# Patient Record
Sex: FEMALE | Race: BLACK OR AFRICAN AMERICAN | Employment: FULL TIME | URBAN - METROPOLITAN AREA
[De-identification: names, ages, dates, MRNs, and addresses within clinical notes are randomized per-mention and may not be internally consistent; named-entity substitution may affect disease eponyms.]

---

## 2017-12-28 PROBLEM — L30.8 SPONGIOTIC DERMATITIS: Status: ACTIVE | Noted: 2017-12-28

## 2018-03-15 DIAGNOSIS — L50.9 URTICARIA: ICD-10-CM

## 2018-03-15 DIAGNOSIS — R60.9 SWELLING: ICD-10-CM

## 2018-03-15 DIAGNOSIS — R21 RASH AND NONSPECIFIC SKIN ERUPTION: ICD-10-CM

## 2018-03-15 RX ORDER — CETIRIZINE HYDROCHLORIDE 10 MG/1
10 TABLET ORAL DAILY
Qty: 90 TABLET | Refills: 1 | Status: SHIPPED | OUTPATIENT
Start: 2018-03-15 | End: 2019-05-30 | Stop reason: SDUPTHER

## 2018-03-15 RX ORDER — FAMOTIDINE 20 MG/1
20 TABLET, FILM COATED ORAL 2 TIMES DAILY
Qty: 180 TABLET | Refills: 1 | Status: SHIPPED | OUTPATIENT
Start: 2018-03-15 | End: 2018-04-26

## 2018-03-16 ENCOUNTER — TELEPHONE (OUTPATIENT)
Dept: FAMILY MEDICINE CLINIC | Age: 23
End: 2018-03-16

## 2018-03-16 DIAGNOSIS — R60.9 SWELLING: Primary | ICD-10-CM

## 2018-03-16 DIAGNOSIS — L50.9 URTICARIA: ICD-10-CM

## 2018-03-16 DIAGNOSIS — L30.8 SPONGIOTIC DERMATITIS: ICD-10-CM

## 2018-03-20 ENCOUNTER — TELEPHONE (OUTPATIENT)
Dept: FAMILY MEDICINE CLINIC | Age: 23
End: 2018-03-20

## 2018-03-20 DIAGNOSIS — R21 RASH AND NONSPECIFIC SKIN ERUPTION: ICD-10-CM

## 2018-03-20 DIAGNOSIS — L30.8 SPONGIOTIC DERMATITIS: Primary | ICD-10-CM

## 2018-04-26 ENCOUNTER — OFFICE VISIT (OUTPATIENT)
Dept: FAMILY MEDICINE CLINIC | Age: 23
End: 2018-04-26
Payer: COMMERCIAL

## 2018-04-26 VITALS
HEART RATE: 55 BPM | SYSTOLIC BLOOD PRESSURE: 118 MMHG | RESPIRATION RATE: 16 BRPM | WEIGHT: 154 LBS | OXYGEN SATURATION: 96 % | TEMPERATURE: 99 F | HEIGHT: 65 IN | DIASTOLIC BLOOD PRESSURE: 64 MMHG | BODY MASS INDEX: 25.66 KG/M2

## 2018-04-26 DIAGNOSIS — L30.9 DERMATITIS: Primary | ICD-10-CM

## 2018-04-26 DIAGNOSIS — Z30.42 ENCOUNTER FOR MANAGEMENT AND INJECTION OF DEPO-PROVERA: ICD-10-CM

## 2018-04-26 DIAGNOSIS — L98.3 WELLS' SYNDROME: ICD-10-CM

## 2018-04-26 LAB
CONTROL: NORMAL
PREGNANCY TEST URINE, POC: NORMAL

## 2018-04-26 PROCEDURE — G8427 DOCREV CUR MEDS BY ELIG CLIN: HCPCS | Performed by: FAMILY MEDICINE

## 2018-04-26 PROCEDURE — 96372 THER/PROPH/DIAG INJ SC/IM: CPT | Performed by: FAMILY MEDICINE

## 2018-04-26 PROCEDURE — 99213 OFFICE O/P EST LOW 20 MIN: CPT | Performed by: FAMILY MEDICINE

## 2018-04-26 PROCEDURE — 1036F TOBACCO NON-USER: CPT | Performed by: FAMILY MEDICINE

## 2018-04-26 PROCEDURE — 81025 URINE PREGNANCY TEST: CPT | Performed by: FAMILY MEDICINE

## 2018-04-26 PROCEDURE — G8419 CALC BMI OUT NRM PARAM NOF/U: HCPCS | Performed by: FAMILY MEDICINE

## 2018-04-26 RX ORDER — MEDROXYPROGESTERONE ACETATE 150 MG/ML
150 INJECTION, SUSPENSION INTRAMUSCULAR ONCE
Status: COMPLETED | OUTPATIENT
Start: 2018-04-26 | End: 2018-04-26

## 2018-04-26 RX ADMIN — MEDROXYPROGESTERONE ACETATE 150 MG: 150 INJECTION, SUSPENSION INTRAMUSCULAR at 15:28

## 2018-04-26 RX ADMIN — MEDROXYPROGESTERONE ACETATE 150 MG: 150 INJECTION, SUSPENSION INTRAMUSCULAR at 08:35

## 2018-04-26 ASSESSMENT — PATIENT HEALTH QUESTIONNAIRE - PHQ9
2. FEELING DOWN, DEPRESSED OR HOPELESS: 0
SUM OF ALL RESPONSES TO PHQ QUESTIONS 1-9: 0
1. LITTLE INTEREST OR PLEASURE IN DOING THINGS: 0
SUM OF ALL RESPONSES TO PHQ9 QUESTIONS 1 & 2: 0

## 2018-05-07 ASSESSMENT — ENCOUNTER SYMPTOMS
WHEEZING: 0
COUGH: 0

## 2018-07-26 ENCOUNTER — HOSPITAL ENCOUNTER (OUTPATIENT)
Age: 23
Discharge: HOME | End: 2018-07-28
Payer: COMMERCIAL

## 2018-07-26 ENCOUNTER — OFFICE VISIT (OUTPATIENT)
Dept: FAMILY MEDICINE CLINIC | Age: 23
End: 2018-07-26
Payer: COMMERCIAL

## 2018-07-26 VITALS
DIASTOLIC BLOOD PRESSURE: 68 MMHG | SYSTOLIC BLOOD PRESSURE: 110 MMHG | WEIGHT: 148 LBS | HEIGHT: 65 IN | OXYGEN SATURATION: 98 % | TEMPERATURE: 99.1 F | HEART RATE: 70 BPM | BODY MASS INDEX: 24.66 KG/M2

## 2018-07-26 DIAGNOSIS — Z87.42 HISTORY OF DYSMENORRHEA: ICD-10-CM

## 2018-07-26 DIAGNOSIS — R21 RASH AND NONSPECIFIC SKIN ERUPTION: ICD-10-CM

## 2018-07-26 DIAGNOSIS — Z12.4 ROUTINE PAPANICOLAOU SMEAR: Primary | ICD-10-CM

## 2018-07-26 DIAGNOSIS — Z30.09 FAMILY PLANNING: ICD-10-CM

## 2018-07-26 LAB
CONTROL: NORMAL
PREGNANCY TEST URINE, POC: NORMAL

## 2018-07-26 PROCEDURE — 87290 VARICELLA ZOSTER AG IF: CPT

## 2018-07-26 PROCEDURE — 87491 CHLMYD TRACH DNA AMP PROBE: CPT

## 2018-07-26 PROCEDURE — 1036F TOBACCO NON-USER: CPT | Performed by: FAMILY MEDICINE

## 2018-07-26 PROCEDURE — 87591 N.GONORRHOEAE DNA AMP PROB: CPT

## 2018-07-26 PROCEDURE — 99395 PREV VISIT EST AGE 18-39: CPT | Performed by: FAMILY MEDICINE

## 2018-07-26 PROCEDURE — G8419 CALC BMI OUT NRM PARAM NOF/U: HCPCS | Performed by: FAMILY MEDICINE

## 2018-07-26 PROCEDURE — 86787 VARICELLA-ZOSTER ANTIBODY: CPT

## 2018-07-26 PROCEDURE — G0123 SCREEN CERV/VAG THIN LAYER: HCPCS

## 2018-07-26 PROCEDURE — 36415 COLL VENOUS BLD VENIPUNCTURE: CPT | Performed by: FAMILY MEDICINE

## 2018-07-26 PROCEDURE — 99213 OFFICE O/P EST LOW 20 MIN: CPT | Performed by: FAMILY MEDICINE

## 2018-07-26 PROCEDURE — 96372 THER/PROPH/DIAG INJ SC/IM: CPT | Performed by: FAMILY MEDICINE

## 2018-07-26 PROCEDURE — 81025 URINE PREGNANCY TEST: CPT | Performed by: FAMILY MEDICINE

## 2018-07-26 PROCEDURE — G8427 DOCREV CUR MEDS BY ELIG CLIN: HCPCS | Performed by: FAMILY MEDICINE

## 2018-07-26 RX ORDER — METHYLPREDNISOLONE 4 MG/1
TABLET ORAL
Qty: 1 KIT | Refills: 0 | Status: SHIPPED | OUTPATIENT
Start: 2018-07-26 | End: 2018-08-01

## 2018-07-26 RX ORDER — TRIAMCINOLONE ACETONIDE 1 MG/G
CREAM TOPICAL
Qty: 1 TUBE | Refills: 1 | Status: SHIPPED | OUTPATIENT
Start: 2018-07-26 | End: 2019-05-30 | Stop reason: ALTCHOICE

## 2018-07-26 RX ORDER — MEDROXYPROGESTERONE ACETATE 150 MG/ML
150 INJECTION, SUSPENSION INTRAMUSCULAR ONCE
Status: COMPLETED | OUTPATIENT
Start: 2018-07-26 | End: 2018-07-26

## 2018-07-26 RX ORDER — VALACYCLOVIR HYDROCHLORIDE 1 G/1
1000 TABLET, FILM COATED ORAL 3 TIMES DAILY
Qty: 15 TABLET | Refills: 0 | Status: SHIPPED | OUTPATIENT
Start: 2018-07-26 | End: 2018-07-31

## 2018-07-26 RX ADMIN — MEDROXYPROGESTERONE ACETATE 150 MG: 150 INJECTION, SUSPENSION INTRAMUSCULAR at 08:31

## 2018-07-26 NOTE — LETTER
[facility] AND [facility]  [address] [address]  Phone: [phone]  Fax: [phone]    Argelia Mathis MD        July 26, 2018     Patient: Connie Glass   YOB: 1995   Date of Visit: 7/26/2018       To Whom it May Concern:    Wendy Smart was seen in my clinic on 7/26/2018. Please excuse from work on July 25, 2018 and July 26 2018. She may return to work on July 29, 2018 . If you have any questions or concerns, please don't hesitate to call.     Sincerely,         Argelia Mathis MD

## 2018-07-26 NOTE — PROGRESS NOTES
triamcinolone (KENALOG) 0.1 % cream; Apply topically 2 times daily. -     VARICELLA ZOSTER VIRUS CULTURE; Future  -     valACYclovir (VALTREX) 1 g tablet; Take 1 tablet by mouth 3 times daily for 5 days    History of dysmenorrhea  Urine hcg negative  Cont depo. -     medroxyPROGESTERone (DEPO-PROVERA) injection 150 mg; Inject 1 mL into the muscle once    Family planning  -     POCT urine pregnancy        F/u 3 months  Sooner if no improvement in rash    Advised patient to call with any new medication issues. All questions answered.   Call or go to ED immediately if symptoms worsen or persist.

## 2018-07-30 LAB
Lab: NORMAL
REPORT: NORMAL
THIS TEST SENT TO: NORMAL
VARICELLA ZOSTER AB IGM: 0.04 ISR

## 2018-07-31 LAB
CHLAMYDIA TRACHOMATIS AMPLIFIED DET: NORMAL
N GONORRHOEAE AMPLIFIED DET: NORMAL
VARICELLA-ZOSTER VIRUS AB, IGG: NORMAL

## 2019-05-28 ENCOUNTER — HOSPITAL ENCOUNTER (EMERGENCY)
Age: 24
Discharge: HOME | End: 2019-05-28
Payer: COMMERCIAL

## 2019-05-28 VITALS
TEMPERATURE: 99.1 F | OXYGEN SATURATION: 99 % | HEART RATE: 101 BPM | WEIGHT: 148 LBS | BODY MASS INDEX: 24.66 KG/M2 | HEIGHT: 65 IN | SYSTOLIC BLOOD PRESSURE: 117 MMHG | RESPIRATION RATE: 16 BRPM | DIASTOLIC BLOOD PRESSURE: 92 MMHG

## 2019-05-28 DIAGNOSIS — T30.0 BURN: Primary | ICD-10-CM

## 2019-05-28 PROCEDURE — 99282 EMERGENCY DEPT VISIT SF MDM: CPT

## 2019-05-28 PROCEDURE — 16020 DRESS/DEBRID P-THICK BURN S: CPT

## 2019-05-28 PROCEDURE — 6370000000 HC RX 637 (ALT 250 FOR IP): Performed by: NURSE PRACTITIONER

## 2019-05-28 RX ORDER — HYDROCODONE BITARTRATE AND ACETAMINOPHEN 5; 325 MG/1; MG/1
1 TABLET ORAL ONCE
Status: COMPLETED | OUTPATIENT
Start: 2019-05-28 | End: 2019-05-28

## 2019-05-28 RX ORDER — HYDROCODONE BITARTRATE AND ACETAMINOPHEN 5; 325 MG/1; MG/1
1 TABLET ORAL EVERY 6 HOURS PRN
Qty: 12 TABLET | Refills: 0 | Status: SHIPPED | OUTPATIENT
Start: 2019-05-28 | End: 2019-05-31

## 2019-05-28 RX ORDER — BACITRACIN, NEOMYCIN, POLYMYXIN B 400; 3.5; 5 [USP'U]/G; MG/G; [USP'U]/G
OINTMENT TOPICAL
Qty: 30 G | Refills: 0 | Status: SHIPPED | OUTPATIENT
Start: 2019-05-28 | End: 2019-06-07

## 2019-05-28 RX ORDER — IBUPROFEN 800 MG/1
800 TABLET ORAL EVERY 6 HOURS PRN
Qty: 21 TABLET | Refills: 0 | Status: SHIPPED | OUTPATIENT
Start: 2019-05-28 | End: 2020-09-01 | Stop reason: ALTCHOICE

## 2019-05-28 RX ORDER — DIAPER,BRIEF,INFANT-TODD,DISP
EACH MISCELLANEOUS
Status: DISCONTINUED
Start: 2019-05-28 | End: 2019-05-28 | Stop reason: HOSPADM

## 2019-05-28 RX ORDER — DIAPER,BRIEF,INFANT-TODD,DISP
EACH MISCELLANEOUS ONCE
Status: COMPLETED | OUTPATIENT
Start: 2019-05-28 | End: 2019-05-28

## 2019-05-28 RX ADMIN — HYDROCODONE BITARTRATE AND ACETAMINOPHEN 1 TABLET: 5; 325 TABLET ORAL at 19:42

## 2019-05-28 RX ADMIN — BACITRACIN ZINC 1 EACH: 500 OINTMENT TOPICAL at 19:42

## 2019-05-28 ASSESSMENT — PAIN DESCRIPTION - LOCATION: LOCATION: HAND

## 2019-05-28 ASSESSMENT — PAIN DESCRIPTION - FREQUENCY: FREQUENCY: CONTINUOUS

## 2019-05-28 ASSESSMENT — PAIN DESCRIPTION - PAIN TYPE: TYPE: ACUTE PAIN

## 2019-05-28 ASSESSMENT — PAIN SCALES - GENERAL: PAINLEVEL_OUTOF10: 10

## 2019-05-28 ASSESSMENT — PAIN DESCRIPTION - DESCRIPTORS: DESCRIPTORS: BURNING

## 2019-05-28 ASSESSMENT — PAIN DESCRIPTION - ORIENTATION: ORIENTATION: LEFT;RIGHT
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
## 2019-05-30 ENCOUNTER — OFFICE VISIT (OUTPATIENT)
Dept: FAMILY MEDICINE CLINIC | Age: 24
End: 2019-05-30
Payer: COMMERCIAL

## 2019-05-30 VITALS
WEIGHT: 165 LBS | OXYGEN SATURATION: 100 % | HEART RATE: 76 BPM | DIASTOLIC BLOOD PRESSURE: 74 MMHG | HEIGHT: 65 IN | BODY MASS INDEX: 27.49 KG/M2 | SYSTOLIC BLOOD PRESSURE: 122 MMHG | RESPIRATION RATE: 16 BRPM

## 2019-05-30 DIAGNOSIS — T23.169D: Primary | ICD-10-CM

## 2019-05-30 DIAGNOSIS — J30.2 SEASONAL ALLERGIES: ICD-10-CM

## 2019-05-30 PROCEDURE — 1036F TOBACCO NON-USER: CPT | Performed by: FAMILY MEDICINE

## 2019-05-30 PROCEDURE — G8427 DOCREV CUR MEDS BY ELIG CLIN: HCPCS | Performed by: FAMILY MEDICINE

## 2019-05-30 PROCEDURE — 99213 OFFICE O/P EST LOW 20 MIN: CPT | Performed by: FAMILY MEDICINE

## 2019-05-30 PROCEDURE — G8419 CALC BMI OUT NRM PARAM NOF/U: HCPCS | Performed by: FAMILY MEDICINE

## 2019-05-30 RX ORDER — CETIRIZINE HYDROCHLORIDE 10 MG/1
10 TABLET ORAL DAILY
Qty: 90 TABLET | Refills: 3 | Status: SHIPPED | OUTPATIENT
Start: 2019-05-30 | End: 2019-09-07 | Stop reason: SDUPTHER

## 2019-05-30 RX ORDER — FLUTICASONE PROPIONATE 50 MCG
2 SPRAY, SUSPENSION (ML) NASAL DAILY
Qty: 1 BOTTLE | Refills: 5 | Status: SHIPPED | OUTPATIENT
Start: 2019-05-30 | End: 2020-09-01 | Stop reason: SDUPTHER

## 2019-05-30 ASSESSMENT — PATIENT HEALTH QUESTIONNAIRE - PHQ9
SUM OF ALL RESPONSES TO PHQ QUESTIONS 1-9: 0
SUM OF ALL RESPONSES TO PHQ QUESTIONS 1-9: 0
2. FEELING DOWN, DEPRESSED OR HOPELESS: 0
1. LITTLE INTEREST OR PLEASURE IN DOING THINGS: 0
SUM OF ALL RESPONSES TO PHQ9 QUESTIONS 1 & 2: 0

## 2019-05-30 ASSESSMENT — ENCOUNTER SYMPTOMS
COUGH: 0
WHEEZING: 0
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
## 2019-05-30 NOTE — LETTER
[address] [place] [address]  Phone: [phone]  Fax: [phone]    Hilda Alvarez MD        May 30, 2019     Patient: Rey Tello   YOB: 1995   Date of Visit: 5/30/2019       To Whom It May Concern: It is my medical opinion that Nadia Gonzalez should remain out of work until Kiki 3, 2019. If you have any questions or concerns, please don't hesitate to call.     Sincerely,        Hilda Alvarez MD

## 2019-09-07 DIAGNOSIS — J30.2 SEASONAL ALLERGIES: ICD-10-CM

## 2019-09-10 RX ORDER — CETIRIZINE HYDROCHLORIDE 10 MG/1
10 TABLET ORAL DAILY
Qty: 90 TABLET | Refills: 3 | Status: SHIPPED | OUTPATIENT
Start: 2019-09-10 | End: 2020-09-01 | Stop reason: SDUPTHER

## 2020-09-01 ENCOUNTER — OFFICE VISIT (OUTPATIENT)
Dept: FAMILY MEDICINE CLINIC | Age: 25
End: 2020-09-01
Payer: COMMERCIAL

## 2020-09-01 ENCOUNTER — HOSPITAL ENCOUNTER (OUTPATIENT)
Age: 25
Discharge: HOME | End: 2020-09-03
Payer: COMMERCIAL

## 2020-09-01 VITALS
OXYGEN SATURATION: 98 % | SYSTOLIC BLOOD PRESSURE: 108 MMHG | BODY MASS INDEX: 27.66 KG/M2 | WEIGHT: 166 LBS | HEIGHT: 65 IN | TEMPERATURE: 96.7 F | HEART RATE: 70 BPM | DIASTOLIC BLOOD PRESSURE: 62 MMHG | RESPIRATION RATE: 18 BRPM

## 2020-09-01 PROCEDURE — 86787 VARICELLA-ZOSTER ANTIBODY: CPT

## 2020-09-01 PROCEDURE — 90471 IMMUNIZATION ADMIN: CPT | Performed by: FAMILY MEDICINE

## 2020-09-01 PROCEDURE — 90715 TDAP VACCINE 7 YRS/> IM: CPT | Performed by: FAMILY MEDICINE

## 2020-09-01 PROCEDURE — 99395 PREV VISIT EST AGE 18-39: CPT | Performed by: FAMILY MEDICINE

## 2020-09-01 PROCEDURE — 86580 TB INTRADERMAL TEST: CPT | Performed by: FAMILY MEDICINE

## 2020-09-01 PROCEDURE — 36415 COLL VENOUS BLD VENIPUNCTURE: CPT | Performed by: FAMILY MEDICINE

## 2020-09-01 PROCEDURE — 86765 RUBEOLA ANTIBODY: CPT

## 2020-09-01 PROCEDURE — 86735 MUMPS ANTIBODY: CPT

## 2020-09-01 PROCEDURE — 86762 RUBELLA ANTIBODY: CPT

## 2020-09-01 PROCEDURE — 86706 HEP B SURFACE ANTIBODY: CPT

## 2020-09-01 RX ORDER — CETIRIZINE HYDROCHLORIDE 10 MG/1
10 TABLET ORAL DAILY
Qty: 90 TABLET | Refills: 3 | Status: SHIPPED
Start: 2020-09-01 | End: 2021-05-15 | Stop reason: SDUPTHER

## 2020-09-01 RX ORDER — FLUTICASONE PROPIONATE 50 MCG
2 SPRAY, SUSPENSION (ML) NASAL DAILY
Qty: 1 BOTTLE | Refills: 5 | Status: SHIPPED
Start: 2020-09-01 | End: 2021-05-15 | Stop reason: SDUPTHER

## 2020-09-01 ASSESSMENT — ENCOUNTER SYMPTOMS
EYE DISCHARGE: 0
SHORTNESS OF BREATH: 0
SORE THROAT: 0
VOMITING: 0
BLOOD IN STOOL: 0
ABDOMINAL PAIN: 0
WHEEZING: 0
SINUS PRESSURE: 0
COUGH: 0

## 2020-09-01 ASSESSMENT — PATIENT HEALTH QUESTIONNAIRE - PHQ9
SUM OF ALL RESPONSES TO PHQ QUESTIONS 1-9: 0
SUM OF ALL RESPONSES TO PHQ9 QUESTIONS 1 & 2: 0
SUM OF ALL RESPONSES TO PHQ QUESTIONS 1-9: 0
1. LITTLE INTEREST OR PLEASURE IN DOING THINGS: 0
2. FEELING DOWN, DEPRESSED OR HOPELESS: 0

## 2020-09-01 NOTE — PROGRESS NOTES
S: 31 y.o. female with   Chief Complaint   Patient presents with    School/Camp Physical       Pt is here for nursing school PE. No complaints today. Does have allergies and needs refills. BP Readings from Last 3 Encounters:   09/01/20 108/62   05/30/19 122/74   05/28/19 (!) 117/92       O: VS:  height is 5' 5\" (1.651 m) and weight is 166 lb (75.3 kg). Her temperature is 96.7 °F (35.9 °C). Her blood pressure is 108/62 and her pulse is 70. Her respiration is 18 and oxygen saturation is 98%. As per resident note. Impression/Plan:   1) school PE - paperwork filled out. Titers drawn today        Health Maintenance Due   Topic Date Due    Varicella vaccine (1 of 2 - 2-dose childhood series) 03/03/1995    HPV vaccine (1 - 2-dose series) 03/03/2005    DTaP/Tdap/Td vaccine (1 - Tdap) 03/03/2013    A1C test (Diabetic or Prediabetic)  12/17/2015    Flu vaccine (1) 09/01/2020         Attending Physician Statement  I have discussed the case, including pertinent history and exam findings with the resident. I also have seen the patient and performed key portions of the examination. I agree with the documented assessment and plan.       Joan Vazquez MD

## 2020-09-01 NOTE — PROGRESS NOTES
9/1/2020    Stuart Limon is a 31 y.o. 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 Does not have any complaints or concerns. States she feels well. Does have seasonal allergies, well controlled on meds, and would like a refill. Patient denies any cough, fever or chills, nausea or vomiting, chest pain, sob, abdominal pain, urinary or bowel issues, palpitations, hx of syncope or any other concerns. BP Readings from Last 3 Encounters:   09/01/20 108/62   05/30/19 122/74   05/28/19 (!) 117/92     Current Outpatient Medications   Medication Sig Dispense Refill    cetirizine (ZYRTEC ALLERGY) 10 MG tablet Take 1 tablet by mouth daily 90 tablet 3    fluticasone (FLONASE) 50 MCG/ACT nasal spray 2 sprays by Each Nostril route daily 1 Bottle 5     Current Facility-Administered Medications   Medication Dose Route Frequency Provider Last Rate Last Dose    medroxyPROGESTERone (DEPO-PROVERA) injection 150 mg  150 mg Intramuscular Q3 Months Leonel Sarkar MD   150 mg at 04/26/18 0835      No Known Allergies    Past Medical & Surgical History:      Diagnosis Date    Abscess of bursa of wrist 12/28/2014     Past Surgical History:   Procedure Laterality Date    WRIST SURGERY         Family History:  No family history on file. Social History:  Social History     Tobacco Use    Smoking status: Never Smoker    Smokeless tobacco: Never Used   Substance Use Topics    Alcohol use: No       Immunization History   Administered Date(s) Administered    Influenza Virus Vaccine 04/01/2016    PPD Test 04/01/2016       Review of Systems   Constitutional: Negative for chills, fever and unexpected weight change. HENT: Negative for congestion, sinus pressure and sore throat. Eyes: Negative for discharge and visual disturbance. Respiratory: Negative for cough, shortness of breath and wheezing. Cardiovascular: Negative for chest pain and leg swelling. Gastrointestinal: Negative for abdominal pain, blood in stool and vomiting. Endocrine: Negative for cold intolerance, heat intolerance and polyuria. Genitourinary: Negative for dysuria and hematuria. Musculoskeletal: Negative for arthralgias, neck pain and neck stiffness. Skin: Negative for rash and wound. Allergic/Immunologic: Positive for environmental allergies. Neurological: Negative for weakness, numbness and headaches. Psychiatric/Behavioral: Negative for agitation and confusion. VS:  /62   Pulse 70   Temp 96.7 °F (35.9 °C)   Resp 18   Ht 5' 5\" (1.651 m)   Wt 166 lb (75.3 kg)   SpO2 98%   BMI 27.62 kg/m²     Physical Exam  Vitals signs reviewed. Constitutional:       General: She is not in acute distress. HENT:      Head: Normocephalic and atraumatic. Nose: No congestion. Mouth/Throat:      Pharynx: No oropharyngeal exudate or posterior oropharyngeal erythema. Eyes:      General: No scleral icterus. Conjunctiva/sclera: Conjunctivae normal.      Pupils: Pupils are equal, round, and reactive to light. Neck:      Musculoskeletal: Normal range of motion and neck supple. Cardiovascular:      Rate and Rhythm: Normal rate and regular rhythm. Heart sounds: Normal heart sounds. No murmur. Pulmonary:      Effort: Pulmonary effort is normal. No respiratory distress. Breath sounds: Normal breath sounds. No wheezing. Abdominal:      General: There is no distension. Palpations: Abdomen is soft. Tenderness: There is no abdominal tenderness. Musculoskeletal:      Right lower leg: No edema. Left lower leg: No edema. Lymphadenopathy:      Cervical: No cervical adenopathy. Skin:     General: Skin is warm. Findings: No rash. Neurological:      General: No focal deficit present. Mental Status: She is alert and oriented to person, place, and time.       Cranial Nerves:

## 2020-09-02 LAB
HBV SURFACE AB TITR SER: REACTIVE {TITER}
RUBELLA ANTIBODY IGG: NORMAL

## 2020-09-03 LAB
MEASLES IMMUNE (IGG): NORMAL
MUMPS AB IGG: NORMAL
VARICELLA-ZOSTER VIRUS AB, IGG: NORMAL

## 2020-09-16 ENCOUNTER — NURSE ONLY (OUTPATIENT)
Dept: FAMILY MEDICINE CLINIC | Age: 25
End: 2020-09-16
Payer: COMMERCIAL

## 2020-09-16 PROCEDURE — 86580 TB INTRADERMAL TEST: CPT | Performed by: FAMILY MEDICINE

## 2020-09-23 ENCOUNTER — NURSE ONLY (OUTPATIENT)
Dept: FAMILY MEDICINE CLINIC | Age: 25
End: 2020-09-23
Payer: COMMERCIAL

## 2020-09-23 PROCEDURE — 86580 TB INTRADERMAL TEST: CPT | Performed by: FAMILY MEDICINE

## 2020-09-23 PROCEDURE — 90471 IMMUNIZATION ADMIN: CPT | Performed by: FAMILY MEDICINE

## 2020-09-23 PROCEDURE — 90686 IIV4 VACC NO PRSV 0.5 ML IM: CPT | Performed by: FAMILY MEDICINE

## 2020-09-23 NOTE — PROGRESS NOTES
Vaccine Information Sheet, \"Influenza - Inactivated\"  given to Mary Alejandra, or parent/legal guardian of  Mary Alejandra and verbalized understanding. Patient responses:    Have you ever had a reaction to a flu vaccine? No  Do you have any current illness? No  Have you ever had Guillian [place] Syndrome? No  Do you have a serious allergy to any of the follow: Neomycin, Polymyxin, Thimerosal, eggs or egg products? No    Flu vaccine given per order. Please see immunization tab. Risks and benefits explained. Current VIS given.       Immunizations Administered     Name Date Dose Route    Influenza, Quadv, IM, PF (6 mo and older Fluzone, Flulaval, Fluarix, and 3 yrs and older Afluria) 9/23/2020 0.5 mL Intramuscular    Site: Deltoid- Left    Lot: [id number]    NDC: 37287-954-81    PPD Test 9/23/2020 0.1 mL Intradermal    Site: Right arm    Lot: [id number]    NDC: 68412-970-88

## 2020-11-11 ENCOUNTER — OFFICE VISIT (OUTPATIENT)
Dept: FAMILY MEDICINE CLINIC | Age: 25
End: 2020-11-11
Payer: COMMERCIAL

## 2020-11-11 VITALS
HEART RATE: 66 BPM | OXYGEN SATURATION: 98 % | SYSTOLIC BLOOD PRESSURE: 110 MMHG | BODY MASS INDEX: 25.76 KG/M2 | RESPIRATION RATE: 18 BRPM | WEIGHT: 154.6 LBS | DIASTOLIC BLOOD PRESSURE: 82 MMHG | TEMPERATURE: 96.8 F | HEIGHT: 65 IN

## 2020-11-11 PROCEDURE — 1036F TOBACCO NON-USER: CPT | Performed by: STUDENT IN AN ORGANIZED HEALTH CARE EDUCATION/TRAINING PROGRAM

## 2020-11-11 PROCEDURE — G8482 FLU IMMUNIZE ORDER/ADMIN: HCPCS | Performed by: STUDENT IN AN ORGANIZED HEALTH CARE EDUCATION/TRAINING PROGRAM

## 2020-11-11 PROCEDURE — 93000 ELECTROCARDIOGRAM COMPLETE: CPT | Performed by: STUDENT IN AN ORGANIZED HEALTH CARE EDUCATION/TRAINING PROGRAM

## 2020-11-11 PROCEDURE — G8427 DOCREV CUR MEDS BY ELIG CLIN: HCPCS | Performed by: STUDENT IN AN ORGANIZED HEALTH CARE EDUCATION/TRAINING PROGRAM

## 2020-11-11 PROCEDURE — G8419 CALC BMI OUT NRM PARAM NOF/U: HCPCS | Performed by: STUDENT IN AN ORGANIZED HEALTH CARE EDUCATION/TRAINING PROGRAM

## 2020-11-11 PROCEDURE — 99213 OFFICE O/P EST LOW 20 MIN: CPT | Performed by: STUDENT IN AN ORGANIZED HEALTH CARE EDUCATION/TRAINING PROGRAM

## 2020-11-11 ASSESSMENT — ENCOUNTER SYMPTOMS
BLOOD IN STOOL: 0
NAUSEA: 0
ABDOMINAL PAIN: 0
SHORTNESS OF BREATH: 0
WHEEZING: 0
SINUS PAIN: 0
BACK PAIN: 0
SORE THROAT: 0
DIARRHEA: 0
VOMITING: 0
CONSTIPATION: 0
COUGH: 0

## 2020-11-11 NOTE — PROGRESS NOTES
Chest pain  Episodic  Now constant for 2 days  No risk factors  Examination  Blood pressure 110/82, pulse 66, temperature 96.8 °F (36 °C), temperature source Temporal, resp. rate 18, height 5' 5\" (1.651 m), weight 154 lb 9.6 oz (70.1 kg), last menstrual period 11/09/2020, SpO2 98 %, not currently breastfeeding. pain reproduced by pressure  A/P  Costochondritis  Trial of NSAIDs  EKG normal  Attending Physician Statement  I have discussed the case, including pertinent history and exam findings with the resident. I agree with the documented assessment and plan.

## 2020-11-11 NOTE — PATIENT INSTRUCTIONS
Patient Education        Costochondritis: Care Instructions  Your Care Instructions  You have chest pain because the cartilage of your rib cage is inflamed. This problem is called costochondritis. This type of chest wall pain may last from days to weeks. It is not a heart problem. Sometimes costochondritis occurs with a cold or the flu, and other times the exact cause is not known. Follow-up care is a key part of your treatment and safety. Be sure to make and go to all appointments, and call your doctor if you are having problems. It's also a good idea to know your test results and keep a list of the medicines you take. How can you care for yourself at home? · Take medicines for pain and inflammation exactly as directed. ? If the doctor gave you a prescription medicine, take it as prescribed. ? If you are not taking a prescription pain medicine, ask your doctor if you can take an over-the-counter medicine. ? Do not take two or more pain medicines at the same time unless the doctor told you to. Many pain medicines have acetaminophen, which is Tylenol. Too much acetaminophen (Tylenol) can be harmful. · It may help to use a warm compress or heating pad (set on low) on your chest. You can also try alternating heat and ice. Put ice or a cold pack on the area for 10 to 20 minutes at a time. Put a thin cloth between the ice and your skin. · Avoid any activity that strains the chest area. As your pain gets better, you can slowly return to your normal activities. · Do not use tape, an elastic bandage, a \"rib belt,\" or anything else that restricts your chest wall motion. When should you call for help? Call 911 anytime you think you may need emergency care. For example, call if:    · You have new or different chest pain or pressure. This may occur with:  ? Sweating. ? Shortness of breath. ? Nausea or vomiting. ? Pain that spreads from the chest to the neck, jaw, or one or both shoulders or arms.   ? Dizziness or lightheadedness. ? A fast or uneven pulse. After calling 911, chew 1 adult-strength aspirin. Wait for an ambulance. Do not try to drive yourself.     · You have severe trouble breathing. Call your doctor now or seek immediate medical care if:    · You have a fever or cough.     · You have any trouble breathing.     · Your chest pain gets worse. Watch closely for changes in your health, and be sure to contact your doctor if:    · Your chest pain continues even though you are taking anti-inflammatory medicine.     · Your chest wall pain has not improved after 5 to 7 days. Where can you learn more? Go to https://[employer].Q.ME. org and sign in to your [employer] account. Enter Q557 in the [employer] box to learn more about \"Costochondritis: Care Instructions. \"     If you do not have an account, please click on the \"Sign Up Now\" link. Current as of: June 26, 2019               Content Version: 12.6  © 2255-9317 [employer]. Care instructions adapted under license by [facility] ([facility]). If you have questions about a medical condition or this instruction, always ask your healthcare professional. [place] [address] any warranty or liability for your use of this information.

## 2020-11-11 NOTE — PROGRESS NOTES
Patient:  Vitor Cruz 31 y.o. female     Date of Service: 11/11/20      Chiefcomplaint:   Chief Complaint   Patient presents with    Chest Pain     Intermittent for about 1 month, worse over the last week    Shortness of Breath     \"     History of Present Illness     Chest pain  First time about a month ago while in the car. Crushing pain in the center of her chest. This went away with rest. Also had a tingling sensation in her right arm. Now more constant over the past two days. Pressure is central and lateral on ribs b/l. No sob. No pleuritic pain. No hx heartburn  No change in activity  No change in weight  No swelling  Started school in september    No close fam hx asthma  Not diabetic  Dad with rheumatoid arthritis  Mom without significant medical problems  No personal hx diabetes  Grandparents on both sides are diabetic    Review of Systems:   Review of Systems   Constitutional: Negative for chills and fever. HENT: Negative for congestion, ear pain, sinus pain and sore throat. Respiratory: Negative for cough, shortness of breath and wheezing. Cardiovascular: Negative for chest pain, palpitations and leg swelling. Gastrointestinal: Negative for abdominal pain, blood in stool, constipation, diarrhea, nausea and vomiting. Endocrine: Negative for polydipsia. Genitourinary: Negative for dysuria, frequency and urgency. Musculoskeletal: Negative for back pain and myalgias. Skin: Negative for rash. Allergic/Immunologic: Negative for environmental allergies. Neurological: Negative for dizziness and headaches. Psychiatric/Behavioral: The patient is not nervous/anxious.         Pertinent Medical, Family, Surgical, Social History:  Past Medical History:   Diagnosis Date    Abscess of bursa of wrist 12/28/2014     Past Surgical History:   Procedure Laterality Date    WRIST SURGERY       Social History     Tobacco History     Smoking Status  Never Smoker    Smokeless Tobacco Use  Never Used          Alcohol History     Alcohol Use Status  No          Drug Use     Drug Use Status  No          Sexual Activity     Sexually Active  Yes Partners  Male              Physical Exam   Vitals: /82 (Site: Left Upper Arm, Position: Sitting, Cuff Size: Medium Adult)   Pulse 66   Temp 96.8 °F (36 °C) (Temporal)   Resp 18   Ht 5' 5\" (1.651 m)   Wt 154 lb 9.6 oz (70.1 kg)   LMP 11/09/2020   SpO2 98%   BMI 25.73 kg/m²   General Appearance: Alert, oriented, no acute distress  HEENT: No scleral icterus. No visible discharge from eyes  Neck: Not rigid. No visible masses  Chest wall/Lung: Clear to auscultation bilaterally,  respirations unlabored. No ronchi/wheezing/rales. Compressing the chest wall reproduces pain in the sternum. Heart[de-identified]  RRR, no murmur  Abdomen: Soft, nontender  Extremities:  No edema  Skin: No rashes. No jaundice  Neuro: Alert and oriented        Psych: Appropriate mood and appropriate affect    Assessment and Plan     1. Costochondral chest pain  Atypical chest pain reproducible with palpation. Recommend trial aleve for 2 weeks. Return in 2 weeks for recheck. If develops associated sob, sweating, worsening with activity go to ed for further eval.     2. Chest pain, unspecified type  ekg unremarkable. - EKG 12 Lead          Return to Office: Return in about 2 weeks (around 11/25/2020). Marely Will,        This document may have been prepared at least partially through the use of voice recognition software. Although effort is taken to assure the accuracy of this document, it is possible that grammatical, syntax,  or spelling errors may occur.

## 2021-01-11 ENCOUNTER — APPOINTMENT (OUTPATIENT)
Dept: CT IMAGING | Age: 26
End: 2021-01-11
Payer: COMMERCIAL

## 2021-01-11 ENCOUNTER — HOSPITAL ENCOUNTER (EMERGENCY)
Age: 26
Discharge: HOME | End: 2021-01-11
Payer: COMMERCIAL

## 2021-01-11 VITALS
OXYGEN SATURATION: 98 % | HEART RATE: 84 BPM | WEIGHT: 158 LBS | TEMPERATURE: 97.3 F | HEIGHT: 65 IN | BODY MASS INDEX: 26.33 KG/M2 | SYSTOLIC BLOOD PRESSURE: 131 MMHG | RESPIRATION RATE: 16 BRPM | DIASTOLIC BLOOD PRESSURE: 84 MMHG

## 2021-01-11 DIAGNOSIS — R51.9 ACUTE NONINTRACTABLE HEADACHE, UNSPECIFIED HEADACHE TYPE: Primary | ICD-10-CM

## 2021-01-11 DIAGNOSIS — J02.0 STREPTOCOCCAL SORE THROAT: ICD-10-CM

## 2021-01-11 LAB
HCG, URINE, POC: NEGATIVE
Lab: NORMAL
NEGATIVE QC PASS/FAIL: NORMAL
POSITIVE QC PASS/FAIL: NORMAL
STREP GRP A PCR: POSITIVE

## 2021-01-11 PROCEDURE — 96375 TX/PRO/DX INJ NEW DRUG ADDON: CPT

## 2021-01-11 PROCEDURE — 2580000003 HC RX 258: Performed by: NURSE PRACTITIONER

## 2021-01-11 PROCEDURE — 99283 EMERGENCY DEPT VISIT LOW MDM: CPT

## 2021-01-11 PROCEDURE — U0003 INFECTIOUS AGENT DETECTION BY NUCLEIC ACID (DNA OR RNA); SEVERE ACUTE RESPIRATORY SYNDROME CORONAVIRUS 2 (SARS-COV-2) (CORONAVIRUS DISEASE [COVID-19]), AMPLIFIED PROBE TECHNIQUE, MAKING USE OF HIGH THROUGHPUT TECHNOLOGIES AS DESCRIBED BY CMS-2020-01-R: HCPCS

## 2021-01-11 PROCEDURE — 87880 STREP A ASSAY W/OPTIC: CPT

## 2021-01-11 PROCEDURE — 6360000002 HC RX W HCPCS: Performed by: NURSE PRACTITIONER

## 2021-01-11 PROCEDURE — 96374 THER/PROPH/DIAG INJ IV PUSH: CPT

## 2021-01-11 PROCEDURE — 70450 CT HEAD/BRAIN W/O DYE: CPT

## 2021-01-11 RX ORDER — METOCLOPRAMIDE HYDROCHLORIDE 5 MG/ML
10 INJECTION INTRAMUSCULAR; INTRAVENOUS ONCE
Status: COMPLETED | OUTPATIENT
Start: 2021-01-11 | End: 2021-01-11

## 2021-01-11 RX ORDER — DIPHENHYDRAMINE HYDROCHLORIDE 50 MG/ML
12.5 INJECTION INTRAMUSCULAR; INTRAVENOUS ONCE
Status: COMPLETED | OUTPATIENT
Start: 2021-01-11 | End: 2021-01-11

## 2021-01-11 RX ORDER — 0.9 % SODIUM CHLORIDE 0.9 %
1000 INTRAVENOUS SOLUTION INTRAVENOUS ONCE
Status: COMPLETED | OUTPATIENT
Start: 2021-01-11 | End: 2021-01-11

## 2021-01-11 RX ORDER — KETOROLAC TROMETHAMINE 30 MG/ML
30 INJECTION, SOLUTION INTRAMUSCULAR; INTRAVENOUS ONCE
Status: COMPLETED | OUTPATIENT
Start: 2021-01-11 | End: 2021-01-11

## 2021-01-11 RX ORDER — IBUPROFEN 800 MG/1
800 TABLET ORAL EVERY 8 HOURS PRN
Qty: 30 TABLET | Refills: 0 | Status: SHIPPED | OUTPATIENT
Start: 2021-01-11 | End: 2021-04-16

## 2021-01-11 RX ORDER — AMOXICILLIN 500 MG/1
500 CAPSULE ORAL 3 TIMES DAILY
Qty: 30 CAPSULE | Refills: 0 | Status: SHIPPED | OUTPATIENT
Start: 2021-01-11 | End: 2021-01-21

## 2021-01-11 RX ADMIN — SODIUM CHLORIDE 1000 ML: 9 INJECTION, SOLUTION INTRAVENOUS at 12:21

## 2021-01-11 RX ADMIN — DIPHENHYDRAMINE HYDROCHLORIDE 12.5 MG: 50 INJECTION, SOLUTION INTRAMUSCULAR; INTRAVENOUS at 12:21

## 2021-01-11 RX ADMIN — METOCLOPRAMIDE HYDROCHLORIDE 10 MG: 5 INJECTION INTRAMUSCULAR; INTRAVENOUS at 12:20

## 2021-01-11 RX ADMIN — KETOROLAC TROMETHAMINE 30 MG: 30 INJECTION, SOLUTION INTRAMUSCULAR at 14:31

## 2021-01-11 ASSESSMENT — PAIN DESCRIPTION - FREQUENCY: FREQUENCY: CONTINUOUS

## 2021-01-11 ASSESSMENT — PAIN DESCRIPTION - LOCATION: LOCATION: HEAD

## 2021-01-11 NOTE — ED PROVIDER NOTES
[address]  Department of Emergency Medicine   ED  Encounter Note  Admit Date/RoomTime: 2021 11:45 AM  ED Room: [facility]    NAME: Sathya Hernadez  : 1995  MRN: [id number]     Chief Complaint:  Headache (started around 0130 this am. aaox4. no n/v noted. )    History of Present Illness      Sathya Hernadez is a 31 y.o. old female who presents to the emergency department by private vehicle, for complaint of gradual onset frontal aching pain which began 1 day(s) prior to arrival.  Since onset the symptoms have been stable and mild-moderate in severity. The patient has history of nothing pertinent. The pain is associated with spore throat and negative for fever, neck pain, numbness, weakness, speech or visual changes. The symptoms are aggravated by movement and relieved by nothing. There has been no history of recent trauma. Treatment prior to arrival consisted of: unable to obtain relief with OTC meds with minimal relief of symptoms. She takes no blood thinning agents. ROS   Pertinent positives and negatives are stated within HPI, all other systems reviewed and are negative. Past Medical History:  has a past medical history of Abscess of bursa of wrist.    Surgical History:  has a past surgical history that includes Wrist surgery. Social History:  reports that she has never smoked. She has never used smokeless tobacco. She reports that she does not drink alcohol or use drugs. Family History: family history is not on file. Allergies: Patient has no known allergies. Physical Exam   Oxygen Saturation Interpretation: Normal.        ED Triage Vitals [21 1007]   BP Temp Temp src Pulse Resp SpO2 Height Weight   127/81 97.3 °F (36.3 °C) -- 75 16 99 % 5' 5\" (1.651 m) 158 lb (71.7 kg)         Constitutional:  Alert, development consistent with age. HEENT:  NC/NT. PERRLA. Airway patent. Neck:  Normal ROM. Supple.   No meningeal signs Respiratory:  Clear to auscultation and breath sounds equal.  CV:  Regular rate and rhythm, normal heart sounds, without pathological murmurs, ectopy, gallops, or rubs. GI:  Abdomen Soft, nontender, good bowel sounds. No firm or pulsatile mass. Back:  No costovertebral tenderness. Extremities: No tenderness or edema noted. Integument:  Normal turgor. Warm, dry, without visible rash, unless noted elsewhere. Lymphatics: No lymphangitis or adenopathy noted. Neurological:  Oriented x 3, GCS 15. CNII-XII grossly intact. Motor functions intact. Lab / Imaging Results   (All laboratory and radiology results have been personally reviewed by myself)  Labs:  Results for orders placed or performed during the hospital encounter of 01/11/21   Strep Screen Group A Throat    Specimen: Throat   Result Value Ref Range    Strep Grp A PCR POSITIVE Negative   Covid-19 Ambulatory   Result Value Ref Range    Source NP swab    POC Pregnancy Urine Qual   Result Value Ref Range    HCG, Urine, POC Negative Negative    Lot Number [id number]     Positive QC Pass/Fail Pass     Negative QC Pass/Fail Pass      Imaging: All Radiology results interpreted by Radiologist unless otherwise noted. CT HEAD WO CONTRAST   Final Result   No evidence for acute intracranial findings.         ED Course / Medical Decision Making     Medications   0.9 % sodium chloride bolus (0 mLs Intravenous Stopped 1/11/21 1431)   metoclopramide (REGLAN) injection 10 mg (10 mg Intravenous Given 1/11/21 1220)   diphenhydrAMINE (BENADRYL) injection 12.5 mg (12.5 mg Intravenous Given 1/11/21 1221)   ketorolac (TORADOL) injection 30 mg (30 mg Intravenous Given 1/11/21 1431)            Consult(s):   None    Procedure(s):   none

## 2021-01-12 ENCOUNTER — CARE COORDINATION (OUTPATIENT)
Dept: CARE COORDINATION | Age: 26
End: 2021-01-12

## 2021-01-12 NOTE — CARE COORDINATION
Patient contacted regarding Sandra Garsia. Discussed COVID-19 related testing which was pending at this time. Test results were pending. Patient informed of results, if available? Results still pending at time of this call. Pt encouraged to monitor results from her active [employer] account. Care Transition Nurse/ Ambulatory Care Manager contacted the patient by telephone to perform post discharge assessment. Call within 2 business days of discharge: Yes. Verified name and  with patient as identifiers. Provided introduction to self, and explanation of the CTN/ACM role, and reason for call due to risk factors for infection and/or exposure to COVID-19. Symptoms reviewed with patient who verbalized the following symptoms: no new symptoms, no worsening symptoms and sore throat, HA. Due to no new or worsening symptoms encounter was not routed to provider for escalation. Discussed follow-up appointments. If no appointment was previously scheduled, appointment scheduling offered: Yes-Pt declined stating that she will schedule an appointment on her own if she feels that she is not improving. Pt states that she typically scheduled her appointments through [address] [facility] follow up appointment(s): No future appointments. Non-[facility] follow up appointment(s):     Non-face-to-face services provided:  Obtained and reviewed discharge summary and/or continuity of care documents     Advance Care Planning:   Does patient have an Advance Directive:  Health care decision maker information verified. Patient has following risk factors of: no known risk factors. CTN/ACM reviewed discharge instructions, medical action plan and red flags such as increased shortness of breath, increasing fever and signs of decompensation with patient who verbalized understanding. Discussed exposure protocols and quarantine with CDC Guidelines What to do if you are sick with coronavirus disease .  Patient was given an opportunity for questions and concerns. The patient agrees to contact the [facility] exposure line [phone], local health department [place] Department of Health: ([phone]) and PCP office for questions related to their healthcare. CTN/ACM provided contact information for future needs. Reviewed and educated patient on any new and changed medications related to discharge diagnosis     Patient/family/caregiver given information for [employer] Loop and agrees to enroll yes  Patient's preferred e-mail: prefers text message option   Patient's preferred phone number: [phone]  Based on Loop alert triggers, patient will be contacted by nurse care manager for worsening symptoms. Pt will be further monitored by COVID Loop Team based on severity of symptoms and risk factors. Called and spoke with pt regarding her ED visit on 1/11/21 for +strep throat. Pt states that she is feeling a little better today. Has mild sore throat and HA this morning. Denies any fevers, cough, or sob. Pt has pending COVID-19 test and is aware and is monitoring results through her [employer] account. Confirmed with pt that she picked up her new scripts and has started taking. Pt declined assistance with scheduling a f/u appt with her pcp, stating that she can make her own appt through [address] Pt agreeable to Loop enrollment. Pt also provided information on the [facility] for future reference or would prefer to f/u there.

## 2021-01-13 LAB
SARS-COV-2: NOT DETECTED
SOURCE: NORMAL

## 2021-04-15 PROBLEM — L30.8 SPONGIOTIC DERMATITIS: Status: RESOLVED | Noted: 2017-12-28 | Resolved: 2021-04-15

## 2021-04-15 NOTE — PROGRESS NOTES
Patient:  Jhon Alex 31 y.o. female     Date of Service: 11/11/20      Chiefcomplaint:   Chief Complaint   Patient presents with    Food Intolerance     Feeling full after small amount of food times 2 weeks    Heartburn     History of Present Illness     Early satiety and pressure in epigatric pain  Feeling some nausea with foods. 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     Past Surgical History:   Procedure Laterality Date    WRIST SURGERY       Social History     Tobacco History     Smoking Status  Never Smoker    Smokeless Tobacco Use  Never Used          Alcohol History     Alcohol Use Status  No          Drug Use     Drug Use Status  No          Sexual Activity     Sexually Active  Yes Partners  Male              Physical Exam   Vitals: /70 (Site: Left Upper Arm, Position: Sitting, Cuff Size: Medium Adult)   Pulse 58   Temp 97.3 °F (36.3 °C) (Temporal)   Resp 18   Ht 5' 5\" (1.651 m)   Wt 140 lb 9.6 oz (63.8 kg)   LMP 04/07/2021 (Exact Date)   SpO2 98%   BMI 23.40 kg/m²   General Appearance: Alert, oriented, no acute distress  HEENT: No scleral icterus. No visible discharge from eyes  Neck: Not rigid. No visible masses  Chest wall/Lung: Clear to auscultation bilaterally,  respirations unlabored. No ronchi/wheezing/rales. Heart[de-identified]  RRR, no murmur  Abdomen: Soft, tender epigastric  Extremities:  No edema  Skin: No rashes. No jaundice  Neuro: Alert and oriented        Psych: Appropriate mood and appropriate affect    Assessment and Plan     Aure was seen today for food intolerance and heartburn.     Diagnoses and all orders for this visit:    Dyspepsia  Recommend eval by gi

## 2021-04-16 ENCOUNTER — HOSPITAL ENCOUNTER (OUTPATIENT)
Age: 26
Discharge: HOME | End: 2021-04-16
Payer: COMMERCIAL

## 2021-04-16 ENCOUNTER — OFFICE VISIT (OUTPATIENT)
Dept: FAMILY MEDICINE CLINIC | Age: 26
End: 2021-04-16
Payer: COMMERCIAL

## 2021-04-16 VITALS
HEIGHT: 65 IN | OXYGEN SATURATION: 98 % | HEART RATE: 58 BPM | SYSTOLIC BLOOD PRESSURE: 112 MMHG | WEIGHT: 140.6 LBS | TEMPERATURE: 97.3 F | DIASTOLIC BLOOD PRESSURE: 70 MMHG | BODY MASS INDEX: 23.43 KG/M2 | RESPIRATION RATE: 18 BRPM

## 2021-04-16 DIAGNOSIS — R11.0 NAUSEA: ICD-10-CM

## 2021-04-16 DIAGNOSIS — R12 HEARTBURN: ICD-10-CM

## 2021-04-16 DIAGNOSIS — R10.13 DYSPEPSIA: Primary | ICD-10-CM

## 2021-04-16 DIAGNOSIS — R63.4 WEIGHT LOSS: ICD-10-CM

## 2021-04-16 LAB
ALBUMIN SERPL-MCNC: 4.5 G/DL (ref 3.5–5.2)
ALP BLD-CCNC: 49 U/L (ref 35–104)
ALT SERPL-CCNC: 8 U/L (ref 0–32)
ANION GAP SERPL CALCULATED.3IONS-SCNC: 5 MMOL/L (ref 7–16)
AST SERPL-CCNC: 8 U/L (ref 0–31)
BASOPHILS ABSOLUTE: 0.04 E9/L (ref 0–0.2)
BASOPHILS RELATIVE PERCENT: 0.5 % (ref 0–2)
BILIRUB SERPL-MCNC: 0.5 MG/DL (ref 0–1.2)
BUN BLDV-MCNC: 10 MG/DL (ref 6–20)
CALCIUM SERPL-MCNC: 9.7 MG/DL (ref 8.6–10.2)
CHLORIDE BLD-SCNC: 106 MMOL/L (ref 98–107)
CO2: 25 MMOL/L (ref 22–29)
CREAT SERPL-MCNC: 0.8 MG/DL (ref 0.5–1)
EOSINOPHILS ABSOLUTE: 0.05 E9/L (ref 0.05–0.5)
EOSINOPHILS RELATIVE PERCENT: 0.7 % (ref 0–6)
GFR AFRICAN AMERICAN: >60
GFR NON-AFRICAN AMERICAN: >60 ML/MIN/1.73
GLUCOSE BLD-MCNC: 95 MG/DL (ref 74–99)
HCT VFR BLD CALC: 35.3 % (ref 34–48)
HEMOGLOBIN: 11.3 G/DL (ref 11.5–15.5)
IMMATURE GRANULOCYTES #: 0.02 E9/L
IMMATURE GRANULOCYTES %: 0.3 % (ref 0–5)
LYMPHOCYTES ABSOLUTE: 2.38 E9/L (ref 1.5–4)
LYMPHOCYTES RELATIVE PERCENT: 31.2 % (ref 20–42)
MCH RBC QN AUTO: 29.6 PG (ref 26–35)
MCHC RBC AUTO-ENTMCNC: 32 % (ref 32–34.5)
MCV RBC AUTO: 92.4 FL (ref 80–99.9)
MONOCYTES ABSOLUTE: 0.5 E9/L (ref 0.1–0.95)
MONOCYTES RELATIVE PERCENT: 6.6 % (ref 2–12)
NEUTROPHILS ABSOLUTE: 4.63 E9/L (ref 1.8–7.3)
NEUTROPHILS RELATIVE PERCENT: 60.7 % (ref 43–80)
PDW BLD-RTO: 12.9 FL (ref 11.5–15)
PLATELET # BLD: 327 E9/L (ref 130–450)
PMV BLD AUTO: 9.9 FL (ref 7–12)
POTASSIUM SERPL-SCNC: 4.1 MMOL/L (ref 3.5–5)
RBC # BLD: 3.82 E12/L (ref 3.5–5.5)
SODIUM BLD-SCNC: 136 MMOL/L (ref 132–146)
TOTAL PROTEIN: 7.8 G/DL (ref 6.4–8.3)
WBC # BLD: 7.6 E9/L (ref 4.5–11.5)

## 2021-04-16 PROCEDURE — 85025 COMPLETE CBC W/AUTO DIFF WBC: CPT

## 2021-04-16 PROCEDURE — 80053 COMPREHEN METABOLIC PANEL: CPT

## 2021-04-16 PROCEDURE — 1036F TOBACCO NON-USER: CPT | Performed by: STUDENT IN AN ORGANIZED HEALTH CARE EDUCATION/TRAINING PROGRAM

## 2021-04-16 PROCEDURE — 36415 COLL VENOUS BLD VENIPUNCTURE: CPT

## 2021-04-16 PROCEDURE — G8427 DOCREV CUR MEDS BY ELIG CLIN: HCPCS | Performed by: STUDENT IN AN ORGANIZED HEALTH CARE EDUCATION/TRAINING PROGRAM

## 2021-04-16 PROCEDURE — G8420 CALC BMI NORM PARAMETERS: HCPCS | Performed by: STUDENT IN AN ORGANIZED HEALTH CARE EDUCATION/TRAINING PROGRAM

## 2021-04-16 PROCEDURE — 99213 OFFICE O/P EST LOW 20 MIN: CPT | Performed by: STUDENT IN AN ORGANIZED HEALTH CARE EDUCATION/TRAINING PROGRAM

## 2021-04-16 RX ORDER — PANTOPRAZOLE SODIUM 40 MG/1
40 TABLET, DELAYED RELEASE ORAL
Qty: 30 TABLET | Refills: 0 | Status: SHIPPED
Start: 2021-04-16 | End: 2021-07-11

## 2021-04-16 ASSESSMENT — PATIENT HEALTH QUESTIONNAIRE - PHQ9
SUM OF ALL RESPONSES TO PHQ QUESTIONS 1-9: 0
2. FEELING DOWN, DEPRESSED OR HOPELESS: 0
SUM OF ALL RESPONSES TO PHQ QUESTIONS 1-9: 0

## 2021-04-16 NOTE — PROGRESS NOTES
[facility]  Precepting Note    Subjective:  Nausea, early satiety  lost some weight  Has heart burn  Non smoker,drinker  Has PUD in family      ROS otherwise negative     Past medical, surgical, family and social history were reviewed, non-contributory, and unchanged unless otherwise stated. Objective:    /70 (Site: Left Upper Arm, Position: Sitting, Cuff Size: Medium Adult)   Pulse 58   Temp 97.3 °F (36.3 °C) (Temporal)   Resp 18   Ht 5' 5\" (1.651 m)   Wt 140 lb 9.6 oz (63.8 kg)   LMP 04/07/2021 (Exact Date)   SpO2 98%   BMI 23.40 kg/m²     Exam is as noted by resident     Assessment/Plan:  Nausea  Weight loss  Labs as ordered  Refer to GI     Attending Physician Statement  I have reviewed the chart, including any radiology or labs. I have discussed the case, including pertinent history and exam findings with the resident. I agree with the assessment, plan and orders as documented by the resident. Please refer to the resident note for additional information.       Electronically signed by Lisa Sandoval MD on 4/16/2021 at 12:03 PM

## 2021-05-15 DIAGNOSIS — J30.2 SEASONAL ALLERGIES: ICD-10-CM

## 2021-05-17 RX ORDER — FLUTICASONE PROPIONATE 50 MCG
2 SPRAY, SUSPENSION (ML) NASAL DAILY
Qty: 1 BOTTLE | Refills: 0 | Status: SHIPPED
Start: 2021-05-17 | End: 2022-05-17 | Stop reason: SDUPTHER

## 2021-05-17 RX ORDER — CETIRIZINE HYDROCHLORIDE 10 MG/1
10 TABLET ORAL DAILY
Qty: 90 TABLET | Refills: 0 | Status: SHIPPED
Start: 2021-05-17 | End: 2021-05-18 | Stop reason: SDUPTHER

## 2021-05-17 NOTE — TELEPHONE ENCOUNTER
Requested Prescriptions     Pending Prescriptions Disp Refills    fluticasone (FLONASE) 50 MCG/ACT nasal spray 1 Bottle 0     Si sprays by Each Nostril route daily    cetirizine (ZYRTEC ALLERGY) 10 MG tablet 90 tablet 0     Sig: Take 1 tablet by mouth daily

## 2021-05-18 RX ORDER — CETIRIZINE HYDROCHLORIDE 10 MG/1
10 TABLET ORAL DAILY
Qty: 90 TABLET | Refills: 0 | Status: SHIPPED
Start: 2021-05-18 | End: 2021-07-11

## 2021-05-21 ENCOUNTER — HOSPITAL ENCOUNTER (OUTPATIENT)
Dept: ULTRASOUND IMAGING | Age: 26
Discharge: HOME | End: 2021-05-23
Payer: COMMERCIAL

## 2021-05-21 DIAGNOSIS — R10.84 ABDOMINAL PAIN, GENERALIZED: ICD-10-CM

## 2021-05-21 DIAGNOSIS — R63.4 WEIGHT LOSS: ICD-10-CM

## 2021-05-21 PROCEDURE — 76705 ECHO EXAM OF ABDOMEN: CPT

## 2021-06-20 ENCOUNTER — HOSPITAL ENCOUNTER (OUTPATIENT)
Age: 26
Discharge: HOME | End: 2021-06-20
Payer: COMMERCIAL

## 2021-06-20 LAB — GONADOTROPIN, CHORIONIC (HCG) QUANT: 576.8 MIU/ML

## 2021-06-20 PROCEDURE — 84702 CHORIONIC GONADOTROPIN TEST: CPT

## 2021-06-20 PROCEDURE — 36415 COLL VENOUS BLD VENIPUNCTURE: CPT

## 2021-07-11 ENCOUNTER — HOSPITAL ENCOUNTER (EMERGENCY)
Age: 26
Discharge: ANOTHER HOSPITAL | End: 2021-07-12
Attending: EMERGENCY MEDICINE
Payer: COMMERCIAL

## 2021-07-11 DIAGNOSIS — O21.0 MILD HYPEREMESIS GRAVIDARUM, ANTEPARTUM: Primary | ICD-10-CM

## 2021-07-11 DIAGNOSIS — R10.31 ABDOMINAL PAIN, RIGHT LOWER QUADRANT: ICD-10-CM

## 2021-07-11 LAB
ALBUMIN SERPL-MCNC: 4.1 G/DL (ref 3.5–5.2)
ALP BLD-CCNC: 45 U/L (ref 35–104)
ALT SERPL-CCNC: 9 U/L (ref 0–32)
ANION GAP SERPL CALCULATED.3IONS-SCNC: 11 MMOL/L (ref 7–16)
AST SERPL-CCNC: 9 U/L (ref 0–31)
BASOPHILS ABSOLUTE: 0.03 E9/L (ref 0–0.2)
BASOPHILS RELATIVE PERCENT: 0.2 % (ref 0–2)
BILIRUB SERPL-MCNC: 0.4 MG/DL (ref 0–1.2)
BUN BLDV-MCNC: 12 MG/DL (ref 6–20)
CALCIUM SERPL-MCNC: 9.2 MG/DL (ref 8.6–10.2)
CHLORIDE BLD-SCNC: 101 MMOL/L (ref 98–107)
CO2: 23 MMOL/L (ref 22–29)
CREAT SERPL-MCNC: 0.7 MG/DL (ref 0.5–1)
EOSINOPHILS ABSOLUTE: 0.02 E9/L (ref 0.05–0.5)
EOSINOPHILS RELATIVE PERCENT: 0.2 % (ref 0–6)
GFR AFRICAN AMERICAN: >60
GFR NON-AFRICAN AMERICAN: >60 ML/MIN/1.73
GLUCOSE BLD-MCNC: 90 MG/DL (ref 74–99)
GONADOTROPIN, CHORIONIC (HCG) QUANT: ABNORMAL MIU/ML
HCT VFR BLD CALC: 30.2 % (ref 34–48)
HEMOGLOBIN: 10.1 G/DL (ref 11.5–15.5)
IMMATURE GRANULOCYTES #: 0.04 E9/L
IMMATURE GRANULOCYTES %: 0.3 % (ref 0–5)
LYMPHOCYTES ABSOLUTE: 1.89 E9/L (ref 1.5–4)
LYMPHOCYTES RELATIVE PERCENT: 15.6 % (ref 20–42)
MCH RBC QN AUTO: 29.4 PG (ref 26–35)
MCHC RBC AUTO-ENTMCNC: 33.4 % (ref 32–34.5)
MCV RBC AUTO: 88 FL (ref 80–99.9)
MONOCYTES ABSOLUTE: 0.74 E9/L (ref 0.1–0.95)
MONOCYTES RELATIVE PERCENT: 6.1 % (ref 2–12)
NEUTROPHILS ABSOLUTE: 9.36 E9/L (ref 1.8–7.3)
NEUTROPHILS RELATIVE PERCENT: 77.6 % (ref 43–80)
PDW BLD-RTO: 13.3 FL (ref 11.5–15)
PLATELET # BLD: 288 E9/L (ref 130–450)
PMV BLD AUTO: 10 FL (ref 7–12)
POTASSIUM SERPL-SCNC: 3.8 MMOL/L (ref 3.5–5)
RBC # BLD: 3.43 E12/L (ref 3.5–5.5)
SODIUM BLD-SCNC: 135 MMOL/L (ref 132–146)
TOTAL PROTEIN: 7.3 G/DL (ref 6.4–8.3)
WBC # BLD: 12.1 E9/L (ref 4.5–11.5)

## 2021-07-11 PROCEDURE — 99284 EMERGENCY DEPT VISIT MOD MDM: CPT

## 2021-07-11 PROCEDURE — 6360000002 HC RX W HCPCS: Performed by: EMERGENCY MEDICINE

## 2021-07-11 PROCEDURE — 85025 COMPLETE CBC W/AUTO DIFF WBC: CPT

## 2021-07-11 PROCEDURE — 96374 THER/PROPH/DIAG INJ IV PUSH: CPT

## 2021-07-11 PROCEDURE — 84702 CHORIONIC GONADOTROPIN TEST: CPT

## 2021-07-11 PROCEDURE — 2500000003 HC RX 250 WO HCPCS: Performed by: EMERGENCY MEDICINE

## 2021-07-11 PROCEDURE — 2580000003 HC RX 258: Performed by: EMERGENCY MEDICINE

## 2021-07-11 PROCEDURE — 36415 COLL VENOUS BLD VENIPUNCTURE: CPT

## 2021-07-11 PROCEDURE — 80053 COMPREHEN METABOLIC PANEL: CPT

## 2021-07-11 PROCEDURE — 96375 TX/PRO/DX INJ NEW DRUG ADDON: CPT

## 2021-07-11 RX ORDER — DIPHENHYDRAMINE HYDROCHLORIDE 50 MG/ML
50 INJECTION INTRAMUSCULAR; INTRAVENOUS ONCE
Status: COMPLETED | OUTPATIENT
Start: 2021-07-11 | End: 2021-07-11

## 2021-07-11 RX ORDER — METOCLOPRAMIDE HYDROCHLORIDE 5 MG/ML
10 INJECTION INTRAMUSCULAR; INTRAVENOUS ONCE
Status: COMPLETED | OUTPATIENT
Start: 2021-07-11 | End: 2021-07-11

## 2021-07-11 RX ORDER — 0.9 % SODIUM CHLORIDE 0.9 %
1500 INTRAVENOUS SOLUTION INTRAVENOUS ONCE
Status: COMPLETED | OUTPATIENT
Start: 2021-07-11 | End: 2021-07-11

## 2021-07-11 RX ADMIN — SODIUM CHLORIDE 1500 ML: 9 INJECTION, SOLUTION INTRAVENOUS at 22:42

## 2021-07-11 RX ADMIN — DIPHENHYDRAMINE HYDROCHLORIDE 50 MG: 50 INJECTION INTRAMUSCULAR; INTRAVENOUS at 22:41

## 2021-07-11 RX ADMIN — METOCLOPRAMIDE HYDROCHLORIDE 10 MG: 5 INJECTION INTRAMUSCULAR; INTRAVENOUS at 22:41

## 2021-07-11 RX ADMIN — FAMOTIDINE 20 MG: 10 INJECTION INTRAVENOUS at 22:41

## 2021-07-12 ENCOUNTER — HOSPITAL ENCOUNTER (EMERGENCY)
Age: 26
Discharge: HOME | End: 2021-07-12
Attending: EMERGENCY MEDICINE
Payer: COMMERCIAL

## 2021-07-12 ENCOUNTER — APPOINTMENT (OUTPATIENT)
Dept: ULTRASOUND IMAGING | Age: 26
End: 2021-07-12
Payer: COMMERCIAL

## 2021-07-12 VITALS
BODY MASS INDEX: 23.16 KG/M2 | TEMPERATURE: 98 F | OXYGEN SATURATION: 98 % | HEART RATE: 62 BPM | DIASTOLIC BLOOD PRESSURE: 74 MMHG | HEIGHT: 65 IN | SYSTOLIC BLOOD PRESSURE: 94 MMHG | RESPIRATION RATE: 16 BRPM | WEIGHT: 139 LBS

## 2021-07-12 VITALS
BODY MASS INDEX: 23.16 KG/M2 | SYSTOLIC BLOOD PRESSURE: 97 MMHG | TEMPERATURE: 98.4 F | RESPIRATION RATE: 16 BRPM | OXYGEN SATURATION: 99 % | DIASTOLIC BLOOD PRESSURE: 56 MMHG | WEIGHT: 139 LBS | HEIGHT: 65 IN | HEART RATE: 60 BPM

## 2021-07-12 DIAGNOSIS — O21.0 MILD HYPEREMESIS GRAVIDARUM, ANTEPARTUM: Primary | ICD-10-CM

## 2021-07-12 DIAGNOSIS — R10.31 ABDOMINAL PAIN, RIGHT LOWER QUADRANT: ICD-10-CM

## 2021-07-12 PROCEDURE — 99285 EMERGENCY DEPT VISIT HI MDM: CPT

## 2021-07-12 PROCEDURE — 96374 THER/PROPH/DIAG INJ IV PUSH: CPT

## 2021-07-12 PROCEDURE — 76801 OB US < 14 WKS SINGLE FETUS: CPT

## 2021-07-12 PROCEDURE — 6360000002 HC RX W HCPCS: Performed by: EMERGENCY MEDICINE

## 2021-07-12 PROCEDURE — 2580000003 HC RX 258: Performed by: EMERGENCY MEDICINE

## 2021-07-12 RX ORDER — ONDANSETRON 4 MG/1
4 TABLET, FILM COATED ORAL EVERY 8 HOURS PRN
Qty: 12 TABLET | Refills: 0 | Status: SHIPPED | OUTPATIENT
Start: 2021-07-12 | End: 2021-07-17

## 2021-07-12 RX ORDER — 0.9 % SODIUM CHLORIDE 0.9 %
1000 INTRAVENOUS SOLUTION INTRAVENOUS ONCE
Status: COMPLETED | OUTPATIENT
Start: 2021-07-12 | End: 2021-07-12

## 2021-07-12 RX ORDER — ONDANSETRON 2 MG/ML
4 INJECTION INTRAMUSCULAR; INTRAVENOUS ONCE
Status: COMPLETED | OUTPATIENT
Start: 2021-07-12 | End: 2021-07-12

## 2021-07-12 RX ADMIN — SODIUM CHLORIDE 1000 ML: 9 INJECTION, SOLUTION INTRAVENOUS at 03:05

## 2021-07-12 RX ADMIN — ONDANSETRON 4 MG: 2 INJECTION INTRAMUSCULAR; INTRAVENOUS at 03:02

## 2021-07-12 RX ADMIN — SODIUM CHLORIDE 1000 ML: 9 INJECTION, SOLUTION INTRAVENOUS at 03:02

## 2021-07-12 ASSESSMENT — PAIN SCALES - GENERAL: PAINLEVEL_OUTOF10: 6

## 2021-07-12 ASSESSMENT — PAIN DESCRIPTION - PAIN TYPE: TYPE: ACUTE PAIN

## 2021-07-12 ASSESSMENT — PAIN DESCRIPTION - LOCATION: LOCATION: ABDOMEN

## 2021-07-12 ASSESSMENT — PAIN DESCRIPTION - ORIENTATION: ORIENTATION: RIGHT

## 2021-07-12 NOTE — ED PROVIDER NOTES
HPI:  7/12/21,   Time: 1:26 AM EDT         Venkat Kumar is a 31 y.o. female presenting to the ED for right lower quadrant pain along with vomiting patient is probably around 7 weeks pregnant by dates but no ultrasound as of yet, beginning today ago. The complaint has been constant, moderate in severity, and worsened by nothing. Vomiting improved with Reglan and Benadryl given over to [facility] and patient sent here for ultrasound to rule out ectopic. No fever chills or night sweats    ROS:   Pertinent positives and negatives are stated within HPI, all other systems reviewed and are negative.  --------------------------------------------- PAST HISTORY ---------------------------------------------  Past Medical History:  has a past medical history of Abscess of bursa of wrist, Open wound of knee, leg, and ankle, and Spongiotic dermatitis. Past Surgical History:  has a past surgical history that includes Wrist surgery. Social History:  reports that she has never smoked. She has never used smokeless tobacco. She reports that she does not drink alcohol and does not use drugs. Family History: family history is not on file. The patients home medications have been reviewed. Allergies: Patient has no known allergies. -------------------------------------------------- RESULTS -------------------------------------------------  All laboratory and radiology results have been personally reviewed by myself   LABS:  No results found for this visit on 07/12/21. RADIOLOGY:  Interpreted by Radiologist.  US OB LESS THAN 14 WEEKS SINGLE OR FIRST GESTATION   Final Result   Single viable intrauterine pregnancy at approximately 7 weeks and 1 day of   gestation. No adnexal masses or fluid in the pelvic cul-de-sac.             ------------------------- NURSING NOTES AND VITALS REVIEWED ---------------------------   The nursing notes within the ED encounter and vital signs as below have been reviewed. hyperemesis gravidarum, antepartum Stable   2.  Abdominal pain, right lower quadrant Improving       DISPOSITION  Disposition: Discharge to home  Patient condition is stable                  Samuel Alford MD  07/12/21 0821

## 2021-07-12 NOTE — ED NOTES
Bed: 02  Expected date:   Expected time:   Means of arrival:   Comments:  [place], RN  07/12/21 0074

## 2021-07-12 NOTE — ED PROVIDER NOTES
HPI:  7/11/21, Time: 10:27 PM EDT        Hugo Causey is a 31 y.o. female presenting to the ED for nausea and vomiting, plus upper abdominal pain beginning 2 days ago. The complaint has been persistent, moderate in severity, and worsened by eating. Pt. Had a positive pregnancy test last week and review of chart notes, patient had a serum quantitative HCG result of 576.8 on 6/20/2021. She states she was out of town yesterday no traveling she developed recurrence nausea vomiting. She is not had any hematemesis no melena hematochezia no change in bladder habit patterns. Patient is not had any fever/chills associated. Patient states she only has abdominal pain when she was vomiting she denies any pain currently. The patient has not had any vaginal bleeding or spotting nor any vaginal discharge and she denies any possibility of STD. No other complaints. Review of Systems:   A complete review of systems was performed and pertinent positives and negatives are stated within HPI, all other systems reviewed and are negative.    --------------------------------------------- PAST HISTORY ---------------------------------------------  Past Medical History:  has a past medical history of Abscess of bursa of wrist, Open wound of knee, leg, and ankle, and Spongiotic dermatitis. Past Surgical History:  has a past surgical history that includes Wrist surgery. Social History:  reports that she has never smoked. She has never used smokeless tobacco. She reports that she does not drink alcohol and does not use drugs. Family History: family history is not on file. The patients home medications have been reviewed. Allergies: Patient has no known allergies.     -------------------------------------------------- RESULTS -------------------------------------------------  All laboratory and radiology results have been personally reviewed by myself   LABS:  Results for orders placed or performed during the hospital encounter of 07/11/21   hCG, quantitative, pregnancy   Result Value Ref Range    hCG Quant 499367.0 (H) <10 mIU/mL   CBC Auto Differential   Result Value Ref Range    WBC 12.1 (H) 4.5 - 11.5 E9/L    RBC 3.43 (L) 3.50 - 5.50 E12/L    Hemoglobin 10.1 (L) 11.5 - 15.5 g/dL    Hematocrit 30.2 (L) 34.0 - 48.0 %    MCV 88.0 80.0 - 99.9 fL    MCH 29.4 26.0 - 35.0 pg    MCHC 33.4 32.0 - 34.5 %    RDW 13.3 11.5 - 15.0 fL    Platelets 673 216 - 262 E9/L    MPV 10.0 7.0 - 12.0 fL    Neutrophils % 77.6 43.0 - 80.0 %    Immature Granulocytes % 0.3 0.0 - 5.0 %    Lymphocytes % 15.6 (L) 20.0 - 42.0 %    Monocytes % 6.1 2.0 - 12.0 %    Eosinophils % 0.2 0.0 - 6.0 %    Basophils % 0.2 0.0 - 2.0 %    Neutrophils Absolute 9.36 (H) 1.80 - 7.30 E9/L    Immature Granulocytes # 0.04 E9/L    Lymphocytes Absolute 1.89 1.50 - 4.00 E9/L    Monocytes Absolute 0.74 0.10 - 0.95 E9/L    Eosinophils Absolute 0.02 (L) 0.05 - 0.50 E9/L    Basophils Absolute 0.03 0.00 - 0.20 E9/L   Comprehensive Metabolic Panel   Result Value Ref Range    Sodium 135 132 - 146 mmol/L    Potassium 3.8 3.5 - 5.0 mmol/L    Chloride 101 98 - 107 mmol/L    CO2 23 22 - 29 mmol/L    Anion Gap 11 7 - 16 mmol/L    Glucose 90 74 - 99 mg/dL    BUN 12 6 - 20 mg/dL    CREATININE 0.7 0.5 - 1.0 mg/dL    GFR Non-African American >60 >=60 mL/min/1.73    GFR African American >60     Calcium 9.2 8.6 - 10.2 mg/dL    Total Protein 7.3 6.4 - 8.3 g/dL    Albumin 4.1 3.5 - 5.2 g/dL    Total Bilirubin 0.4 0.0 - 1.2 mg/dL    Alkaline Phosphatase 45 35 - 104 U/L    ALT 9 0 - 32 U/L    AST 9 0 - 31 U/L       RADIOLOGY:  Interpreted by Radiologist.  No orders to display     ------------------------- NURSING NOTES AND VITALS REVIEWED ---------------------------    The nursing notes within the ED encounter and vital signs as below have been reviewed.    /71   Pulse 55   Temp 98 °F (36.7 °C) (Temporal)   Resp 16   Ht 5' 5\" (1.651 m)   Wt 139 lb (63 kg)   LMP 05/24/2021 (Approximate)   SpO2 99%   BMI 23.13 kg/m²  Oxygen Saturation Interpretation: Normal    ---------------------------------------------------PHYSICAL EXAM--------------------------------------    Constitutional/General: Alert and oriented x3, well appearing, non toxic in no distress  Head: Normocephalic and atraumatic  Eyes: PERRL, EOMI  Mouth: Oropharynx clear, handling secretions, no trismus  Neck: Supple, full ROM, no JVD. Trachea midline  Pulmonary: Lungs clear to auscultation bilaterally, no wheezes, rales, or rhonchi. Not in respiratory distress  Cardiovascular:  Regular rate and rhythm, no murmurs, gallops, or rubs. 2+ distal pulses  Abdomen: Soft, non tender, non distended, No organomegaly no masses no guarding no rigidity normal bowel sounds  No focal tenderness at all in the right lower quadrant/McBurney's point. Skin: warm and dry without rash  Neurologic: GCS 15, cranial nerves II through XII intact no focal deficits. No meningeal signs  Psych: Normal Affect    ------------------------------ ED COURSE/MEDICAL DECISION MAKING----------------------  Medications   0.9 % sodium chloride bolus (0 mLs Intravenous Stopped 7/11/21 2331)   famotidine (PEPCID) injection 20 mg (20 mg Intravenous Given 7/11/21 2241)   diphenhydrAMINE (BENADRYL) injection 50 mg (50 mg Intravenous Given 7/11/21 2241)   metoclopramide (REGLAN) injection 10 mg (10 mg Intravenous Given 7/11/21 2241)     ED COURSE:     Medical Decision Making:   Patient is felt to likely have hyperemesis gravidarum. She received IV fluids here in the department. We will also order a serum quantitative hCG. Patient has right lower abdominal pain and her serum quantitative HCG has increased significantly. Pt. Will be sent to [address] [place] [address] to rule out ectopic pregnancy or appendicitis on initial exam, serial evaluations will be performed.   Patient received both Benadryl Reglan both which are safe in pregnancy for treatment of her nausea has significantly improved. Pt. Understands the reason for the transfer and agrees w/ the plan for transfer to [facility] ED. Counseling: The emergency provider has spoken with the patient and discussed todays results, in addition to providing specific details for the plan of care and counseling regarding the diagnosis and prognosis. Questions are answered at this time and they are agreeable with the plan. Consults:  Spoke w/ Dr. Jewel Santamaria @ [facility] ED and he accepted the patient transfer. --------------------------------- IMPRESSION AND DISPOSITION ---------------------------------    IMPRESSION  1. Mild hyperemesis gravidarum, antepartum    2. Abdominal pain, right lower quadrant        DISPOSITION  Disposition: Transfer to [facility] ED for Pelvic OB Ultrasound  Condition: Stable      NOTE: This report was transcribed using voice recognition software.  Every effort was made to ensure accuracy; however, inadvertent computerized transcription errors may be present        Hugo Wong MD  07/11/21 0650

## 2021-07-12 NOTE — ED NOTES
Access line called.  Dr. Brasher Suman speaking to [facility] ED doctor     Flor Arias  07/11/21 5101

## 2021-08-18 ENCOUNTER — APPOINTMENT (OUTPATIENT)
Dept: GENERAL RADIOLOGY | Age: 26
End: 2021-08-18
Payer: COMMERCIAL

## 2021-08-18 PROCEDURE — U0003 INFECTIOUS AGENT DETECTION BY NUCLEIC ACID (DNA OR RNA); SEVERE ACUTE RESPIRATORY SYNDROME CORONAVIRUS 2 (SARS-COV-2) (CORONAVIRUS DISEASE [COVID-19]), AMPLIFIED PROBE TECHNIQUE, MAKING USE OF HIGH THROUGHPUT TECHNOLOGIES AS DESCRIBED BY CMS-2020-01-R: HCPCS

## 2021-08-18 PROCEDURE — 87880 STREP A ASSAY W/OPTIC: CPT

## 2021-08-18 PROCEDURE — 99284 EMERGENCY DEPT VISIT MOD MDM: CPT

## 2021-08-18 PROCEDURE — 71046 X-RAY EXAM CHEST 2 VIEWS: CPT

## 2021-08-18 PROCEDURE — U0005 INFEC AGEN DETEC AMPLI PROBE: HCPCS

## 2021-08-18 ASSESSMENT — PAIN DESCRIPTION - PAIN TYPE: TYPE: ACUTE PAIN

## 2021-08-18 ASSESSMENT — PAIN DESCRIPTION - DESCRIPTORS: DESCRIPTORS: STABBING

## 2021-08-18 ASSESSMENT — PAIN DESCRIPTION - ONSET: ONSET: ON-GOING

## 2021-08-18 ASSESSMENT — PAIN SCALES - GENERAL: PAINLEVEL_OUTOF10: 8

## 2021-08-18 ASSESSMENT — PAIN DESCRIPTION - LOCATION: LOCATION: CHEST

## 2021-08-18 ASSESSMENT — PAIN DESCRIPTION - ORIENTATION: ORIENTATION: RIGHT;LOWER

## 2021-08-19 ENCOUNTER — HOSPITAL ENCOUNTER (EMERGENCY)
Age: 26
Discharge: HOME | End: 2021-08-19
Payer: COMMERCIAL

## 2021-08-19 VITALS
SYSTOLIC BLOOD PRESSURE: 109 MMHG | RESPIRATION RATE: 18 BRPM | OXYGEN SATURATION: 97 % | HEIGHT: 65 IN | WEIGHT: 140 LBS | TEMPERATURE: 99.5 F | DIASTOLIC BLOOD PRESSURE: 67 MMHG | BODY MASS INDEX: 23.32 KG/M2 | HEART RATE: 89 BPM

## 2021-08-19 DIAGNOSIS — J06.9 VIRAL URI WITH COUGH: Primary | ICD-10-CM

## 2021-08-19 DIAGNOSIS — Z20.822 SUSPECTED COVID-19 VIRUS INFECTION: ICD-10-CM

## 2021-08-19 LAB — STREP GRP A PCR: NEGATIVE

## 2021-08-19 PROCEDURE — 6370000000 HC RX 637 (ALT 250 FOR IP): Performed by: PHYSICIAN ASSISTANT

## 2021-08-19 RX ORDER — ONDANSETRON 4 MG/1
4 TABLET, ORALLY DISINTEGRATING ORAL ONCE
Status: COMPLETED | OUTPATIENT
Start: 2021-08-19 | End: 2021-08-19

## 2021-08-19 RX ORDER — IBUPROFEN 800 MG/1
800 TABLET ORAL ONCE
Status: COMPLETED | OUTPATIENT
Start: 2021-08-19 | End: 2021-08-19

## 2021-08-19 RX ORDER — ACETAMINOPHEN 500 MG
1000 TABLET ORAL ONCE
Status: COMPLETED | OUTPATIENT
Start: 2021-08-19 | End: 2021-08-19

## 2021-08-19 RX ADMIN — ONDANSETRON 4 MG: 4 TABLET, ORALLY DISINTEGRATING ORAL at 01:09

## 2021-08-19 RX ADMIN — ACETAMINOPHEN 1000 MG: 500 TABLET ORAL at 01:09

## 2021-08-19 RX ADMIN — IBUPROFEN 800 MG: 800 TABLET, FILM COATED ORAL at 01:09

## 2021-08-19 ASSESSMENT — PAIN SCALES - GENERAL: PAINLEVEL_OUTOF10: 8

## 2021-08-19 NOTE — ED PROVIDER NOTES
[facility]  HPI:  8/19/21, Time: 12:18 AM EDT         Charisma Fish is a 31 y.o. female presenting to the ED for cough fever chills body beginning yesterday afternoon. The complaint has been persistent, moderate in severity, and worsened by nothing. Patient comes in with complaint of generalized headache sore throat nonproductive cough started around 1:00 today. Patient states she has some intermittent shortness of breath denies any chest pain. No known sick contacts. She denies any abdominal pain no omiting diarrhea no urinary frequency urgency burning. She has intermittent nausea. She states she did take Tylenol at 1 and then Motrin around 5 for her fever chills. Review of Systems:   A complete review of systems was performed and pertinent positives and negatives are stated within HPI, all other systems reviewed and are negative.          --------------------------------------------- PAST HISTORY ---------------------------------------------  Past Medical History:  has a past medical history of Abscess of bursa of wrist, Open wound of knee, leg, and ankle, and Spongiotic dermatitis. Past Surgical History:  has a past surgical history that includes Wrist surgery. Social History:  reports that she has never smoked. She has never used smokeless tobacco. She reports that she does not drink alcohol and does not use drugs. Family History: family history is not on file. The patients home medications have been reviewed. Allergies: Patient has no known allergies.     -------------------------------------------------- RESULTS -------------------------------------------------  All laboratory and radiology results have been personally reviewed by myself   LABS:  Results for orders placed or performed during the hospital encounter of 08/19/21   Strep Screen Group A Throat    Specimen: Throat   Result Value Ref Range    Strep Grp A PCR Negative Negative       RADIOLOGY:  Interpreted by Radiologist.  XR CHEST (2 VW)   Final Result   No acute process. Left nipple piercing in place. ------------------------- NURSING NOTES AND VITALS REVIEWED ---------------------------   The nursing notes within the ED encounter and vital signs as below have been reviewed. /67   Pulse 89   Temp 99.5 °F (37.5 °C) (Oral)   Resp 18   Ht 5' 5\" (1.651 m)   Wt 140 lb (63.5 kg)   LMP 05/24/2021 (Approximate)   SpO2 97%   BMI 23.30 kg/m²   Oxygen Saturation Interpretation: Normal      ---------------------------------------------------PHYSICAL EXAM--------------------------------------      Constitutional/General: Alert and oriented x3, well appearing, non toxic in NAD  Head: Normocephalic and atraumatic  Eyes: PERRL, EOMI  Mouth: Oropharynx clear, handling secretions, no trismus mild erythema pharynx no tonsillar swelling or exudate uvula is midline  Neck: Supple, full ROM,   Pulmonary: Lungs clear to auscultation bilaterally, no wheezes, rales, or rhonchi. Not in respiratory distress  Cardiovascular:  Regular rate and rhythm, no murmurs, gallops, or rubs. 2+ distal pulses  Abdomen: Soft, non tender, non distended, no CVA tenderness  Extremities: Moves all extremities x 4. Warm and well perfused  Skin: warm and dry without rash  Neurologic: GCS 15,  Psych: Normal Affect      ------------------------------ ED COURSE/MEDICAL DECISION MAKING----------------------  Medications   acetaminophen (TYLENOL) tablet 1,000 mg (1,000 mg Oral Given 8/19/21 0109)   ibuprofen (ADVIL;MOTRIN) tablet 800 mg (800 mg Oral Given 8/19/21 0109)   ondansetron (ZOFRAN-ODT) disintegrating tablet 4 mg (4 mg Oral Given 8/19/21 0109)         ED COURSE:   Patient has history of recent miscarriage. Medical Decision Making:   Patient comes in with complaint of fever chills generalized body aches with sore throat cough started earlier today. Chest x-ray no pneumonia.   COVID-19 testing was sent out patient is in no respiratory distress oxygenating well. Counseling: The emergency provider has spoken with the patient and discussed todays results, in addition to providing specific details for the plan of care and counseling regarding the diagnosis and prognosis. Questions are answered at this time and they are agreeable with the plan.      --------------------------------- IMPRESSION AND DISPOSITION ---------------------------------    IMPRESSION  1. Viral URI with cough    2. Suspected COVID-19 virus infection        DISPOSITION  Disposition: Discharge to home  Patient condition is good      NOTE: This report was transcribed using voice recognition software.  Every effort was made to ensure accuracy; however, inadvertent computerized transcription errors may be present     [place], [place]  08/19/21 0424

## 2021-08-19 NOTE — ED NOTES
FIRST PROVIDER CONTACT ASSESSMENT NOTE                                                                                                Department of Emergency Medicine                                                      First Provider Note  21  10:11 PM EDT  NAME: Abhishek Payton  : 1995  MRN: [id number]    Chief Complaint: Headache (onset today), Generalized Body Aches, Chills, and Chest Pain (beneath right breast)      History of Present Illness:   Abhishek Payton is a 31 y.o. female who presents to the ED for fever chills body aches chest discomfort with coughing only headache started today    Focused Physical Exam:  VS:    ED Triage Vitals   BP Temp Temp src Pulse Resp SpO2 Height Weight   -- -- -- -- -- -- -- --        General: Alert and in no apparent distress. Heart regular rate and rhythm  Lungs clear    Medical History:  has a past medical history of Abscess of bursa of wrist, Open wound of knee, leg, and ankle, and Spongiotic dermatitis. Surgical History:  has a past surgical history that includes Wrist surgery. Social History:  reports that she has never smoked. She has never used smokeless tobacco. She reports that she does not drink alcohol and does not use drugs. Family History: family history is not on file. Allergies: Patient has no known allergies.      Initial Plan of Care:  Initiate Treatment-Testing, Proceed toTreatment Area When Bed Available for ED Attending/MLP to Continue Care    -------------------------------------------------END OF FIRST PROVIDER CONTACT ASSESSMENT NOTE--------------------------------------------------------  Electronically signed by BETTY Marshall   DD: 21     BETTY Marshall  21 [phone]

## 2021-08-20 ENCOUNTER — CARE COORDINATION (OUTPATIENT)
Dept: CARE COORDINATION | Age: 26
End: 2021-08-20

## 2021-08-20 LAB — SARS-COV-2, PCR: DETECTED

## 2021-08-20 NOTE — CARE COORDINATION
Patient contacted regarding COVID-19 diagnosis. Discussed COVID-19 related testing which was available at this time. Test results were positive. Patient informed of results, if available? Yes. Ambulatory Care Manager contacted the patient by telephone to perform post discharge assessment. Call within 2 business days of discharge: Yes. Verified name and  with patient as identifiers. Provided introduction to self, and explanation of the CTN/ACM role, and reason for call due to risk factors for infection and/or exposure to COVID-19. Symptoms reviewed with patient who verbalized the following symptoms: pain or aching joints, shortness of breath, chills or shaking, nausea and headache. Patient states her symptoms continue. She states she no longer has chest pain. Due to no new or worsening symptoms encounter was not routed to provider for escalation. Discussed follow-up appointments. If no appointment was previously scheduled, appointment scheduling offered: Yes. [facility] follow up appointment(s): No future appointments. Non-[facility] follow up appointment(s):   D/t ACM informed patient that she was positive for COVID, patient will call Dr. Zain Arroyo to determine if PCP will order any medications for her symptoms, and schedule and ED F/U when quarantine period is ended. Patient does not require this ACM's assistance in making this call. Non-face-to-face services provided:  Reviewed AVS     Advance Care Planning:   Does patient have an Advance Directive: Healthcare decision makers were reviewed, information is current. Educated patient about risk for severe COVID-19 due to risk factors according to CDC guidelines. ACM reviewed discharge instructions, medical action plan and red flag symptoms with the patient who verbalized understanding. Discussed COVID vaccination status: Yes. Patient is not currently vaccinated.      Reviewed and educated patient on any new and changed medications related to discharge diagnosis     No medications were ordered by the ED provider. Was patient discharged with a pulse oximeter? No   Patient was able to speak in full sentences on this outreach. ACM provided contact information. Plan for follow-up call in 5-7 days based on severity of symptoms and risk factors. Steps to help prevent the spread of COVID-19 if you are sick  SOURCE - [email or web address].info/. html     Stay home except to get medical care   ; Stay home: People who are mildly ill with COVID-19 are able to isolate at home during their illness. You should restrict activities outside your home, except for getting medical care.   ; Avoid public areas: Do not go to work, school, or public areas.   ; Avoid public transportation: Avoid using public transportation, ride-sharing, or taxis.  ; Separate yourself from other people and animals in your home   ; Stay away from others: As much as possible, you should stay in a specific room and away from other people in your home. Also, you should use a separate bathroom, if available.   ; Limit contact with pets & animals: You should restrict contact with pets and other animals while you are sick with COVID-19, just like you would around other people. Although there have not been reports of pets or other animals becoming sick with COVID-19, it is still recommended that people sick with COVID-19 limit contact with animals until more information is known about the virus. ; When possible, have another member of your household care for your animals while you are sick. If you are sick with COVID-19, avoid contact with your pet, including petting, snuggling, being kissed or licked, and sharing food. If you must care for your pet or be around animals while you are sick, wash your hands before and after you interact with pets and wear a facemask. See COVID-19 and Animals for more information.        Other considerations   The ill person should eat/be fed in their room if possible. Non-disposable  items used should be handled with gloves and washed with hot water or in a . Clean hands after handling used  items.  If possible, dedicate a lined trash can for the ill person. Use gloves when removing garbage bags, handling, and disposing of trash. Wash hands after handling or disposing of trash.  Consider consulting with your local health department about trash disposal guidance if available. Information for Household Members and Caregivers of Someone who is Sick   Call ahead before visiting your doctor   Call ahead: If you have a medical appointment, call the healthcare provider and tell them that you have or may have COVID-19. This will help the healthcare provider's office take steps to keep other people from getting infected or exposed. Wear a facemask if you are sick   ; If you are sick: You should wear a facemask when you are around other people (e.g., sharing a room or vehicle) or pets and before you enter a healthcare provider's office. ; If you are caring for others: If the person who is sick is not able to wear a facemask (for example, because it causes trouble breathing), then people who live with the person who is sick should not stay in the same room with them, or they should wear a facemask if they enter a room with the person who is sick. Cover your coughs and sneezes   ; Cover: Cover your mouth and nose with a tissue when you cough or sneeze.   ; Dispose: Throw used tissues in a lined trash can.   ; Wash hands: Immediately wash your hands with soap and water for at least 20 seconds or, if soap and water are not available, clean your hands with an alcohol-based hand  that contains at least 60% alcohol. Clean your hands often   ;  Wash hands: Wash your hands often with soap and water for at least 20 seconds, especially after blowing your nose, coughing, or sneezing; going to the bathroom; and before eating or preparing food.   ; Hand : If soap and water are not readily available, use an alcohol-based hand  with at least 60% alcohol, covering all surfaces of your hands and rubbing them together until they feel dry.   ; Soap and water: Soap and water are the best option if hands are visibly dirty.   ; Avoid touching: Avoid touching your eyes, nose, and mouth with unwashed hands. Handwashing Tips   ; Wet your hands with clean, running water (warm or cold), turn off the tap, and apply soap.  ; Lather your hands by rubbing them together with the soap. Lather the backs of your hands, between your fingers, and under your nails. ; Scrub your hands for at least 20 seconds. Need a timer? Hum the [place] from beginning to end twice.  ; Rinse your hands well under clean, running water.  ; Dry your hands using a clean towel or air dry them. Avoid sharing personal household items   ; Do not share: You should not share dishes, drinking glasses, cups, eating utensils, towels, or bedding with other people or pets in your home.   ; Wash thoroughly after use: After using these items, they should be washed thoroughly with soap and water. Clean all high-touch surfaces everyday   ; Clean and disinfect: Practice routine cleaning of high touch surfaces.  ; High touch surfaces include counters, tabletops, doorknobs, bathroom fixtures, toilets, phones, keyboards, tablets, and bedside tables.  ; Disinfect areas with bodily fluids: Also, clean any surfaces that may have blood, stool, or body fluids on them.   ; Household : Use a household cleaning spray or wipe, according to the label instructions. Labels contain instructions for safe and effective use of the cleaning product including precautions you should take when applying the product, such as wearing gloves and making sure you have good ventilation during use of the product.     Monitor your symptoms   Seek medical attention: Seek prompt medical attention if your illness is worsening     (e.g., difficulty breathing).   ; Call your doctor: Before seeking care, call your healthcare provider and tell them that you have, or are being evaluated for, COVID-19.   ; Wear a facemask when sick: Put on a facemask before you enter the facility. These steps will help the healthcare provider's office to keep other people in the office or waiting room from getting infected or exposed. ; Alert health department: Ask your healthcare provider to call the local or state health department. Persons who are placed under active monitoring or facilitated self-monitoring should follow instructions provided by their local health department or occupational health professionals, as appropriate.  ; Call 911 if you have a medical emergency: If you have a medical emergency and need to call 911, notify the dispatch personnel that you have, or are being evaluated for COVID-19. If possible, put on a facemask before emergency medical services arrive.

## 2021-08-24 ENCOUNTER — TELEMEDICINE (OUTPATIENT)
Dept: FAMILY MEDICINE CLINIC | Age: 26
End: 2021-08-24
Payer: COMMERCIAL

## 2021-08-24 DIAGNOSIS — U07.1 COVID-19: Primary | ICD-10-CM

## 2021-08-24 PROCEDURE — G8427 DOCREV CUR MEDS BY ELIG CLIN: HCPCS | Performed by: FAMILY MEDICINE

## 2021-08-24 PROCEDURE — 99213 OFFICE O/P EST LOW 20 MIN: CPT | Performed by: FAMILY MEDICINE

## 2021-08-24 NOTE — PROGRESS NOTES
[facility] Video Visit Precepting Note    Subjective: This Telehealth visit was performed as two-way, audio-video technology platform. Verbal consent was taken from patient as noted in medical assistant's/nurse's note. COVID + on 8/19/21 in ED, SOA/cough/fever  Eating and drinking OK  No steroids  No further fever  No cough/SOA/CP  +quarantine    Objective:  As noted in resident's note. Assessment/Plan:  COVID19 infection: Continue supportive therapy     Attending Physician Statement  I have reviewed the chart, including any radiology or labs. I have discussed the case, including pertinent history with the resident. I agree with the assessment, plan and orders as documented by the resident. Please refer to the resident note for additional information.     Electronically signed by Taz Hicks MD on 8/24/21 at 3:37 PM EDT

## 2021-08-24 NOTE — PROGRESS NOTES
Carlos Louise (:  1995) is a 31 y.o. female,Established patient, here for evaluation of the following chief complaint(s): Positive For Covid-19 (f/u - improving )         ASSESSMENT/PLAN:  1. COVID-19  resolving  continue symptomatic mgmt  In quarantine at this time   No new sob, cp, fever     Return in about 3 months (around 2021). SUBJECTIVE/OBJECTIVE:  HPI     For ER fU covid     Went to ER for sob  Loss of smell and taste  Drinking eating ok  Had fever in beginning  Days  Not using steroids or abx  Living at home   In quarantine       Review of Systems   Constitutional: Positive for fatigue. Negative for chills and fever. HENT: Negative for congestion, sore throat and trouble swallowing. Respiratory: Negative for cough. Cardiovascular: Negative for chest pain and leg swelling. Gastrointestinal: Negative for abdominal pain, constipation, diarrhea, nausea and vomiting. Genitourinary: Negative for difficulty urinating. Musculoskeletal: Negative for arthralgias and myalgias. Skin: Negative for rash and wound. Neurological: Negative for dizziness and headaches. Psychiatric/Behavioral: Negative for agitation. No flowsheet data found.      Physical Exam    [INSTRUCTIONS:  \"[x]\" Indicates a positive item  \"[]\" Indicates a negative item  -- DELETE ALL ITEMS NOT EXAMINED]    Constitutional: [x] Appears well-developed and well-nourished [x] No apparent distress      [] Abnormal -     Mental status: [x] Alert and awake  [x] Oriented to person/place/time [x] Able to follow commands    [] Abnormal -     Eyes:   EOM    [x]  Normal    [] Abnormal -   Sclera  [x]  Normal    [] Abnormal -          Discharge [x]  None visible   [] Abnormal -     HENT: [x] Normocephalic, atraumatic  [] Abnormal -   [] Mouth/Throat: Mucous membranes are moist    External Ears [x] Normal  [] Abnormal -    Neck: [x] No visualized mass [] Abnormal -     Pulmonary/Chest: [x] Respiratory effort normal   [x] No visualized signs of difficulty breathing or respiratory distress        [] Abnormal -      Musculoskeletal:   [] Normal gait with no signs of ataxia         [] Normal range of motion of neck        [] Abnormal -     Neurological:        [] No Facial Asymmetry (Cranial nerve 7 motor function) (limited exam due to video visit)          [] No gaze palsy        [] Abnormal -          Skin:        [x] No significant exanthematous lesions or discoloration noted on facial skin         [] Abnormal -            Psychiatric:       [x] Normal Affect [] Abnormal -        [] No Hallucinations    Other pertinent observable physical exam findings:-      On this date 8/24/2021 I have spent 20 minutes reviewing previous notes, test results and face to face (virtual) with the patient discussing the diagnosis and importance of compliance with the treatment plan as well as documenting on the day of the visit. Tami Farley was evaluated through a synchronous (real-time) audio-video encounter. The patient (or guardian if applicable) is aware that this is a billable service. Verbal consent to proceed has been obtained within the past 12 months. The visit was conducted pursuant to the emergency declaration under the [address] [place] [address] authority and the [employer] and [employer] General Act. Patient identification was verified, and a caregiver was present when appropriate. The patient was located in a state where the provider was credentialed to provide care. An electronic signature was used to authenticate this note.     --Cornel Underwood MD

## 2021-08-24 NOTE — PROGRESS NOTES
TeleMedicine Patient Consent    This visit was performed as a virtual video visit using a synchronous, two-way, audio-video telehealth technology platform. Patient identification was verified at the start of the visit, including the patient's telephone number and physical location. I discussed with the patient the nature of our telehealth visits, that:     1. Due to the nature of an audio- video modality, the only components of a physical exam that could be done are the elements supported by direct observation. 2. The provider will evaluate the patient and recommend diagnostics and treatments based on their assessment. 3. If it was felt that the patient should be evaluated in clinic or an emergency room setting, then they would be directed there. 4. Our sessions are not being recorded and that personal health information is protected. 5. Our team would provide follow up care in person if/when the patient needs it. Patient does agree to proceed with telemedicine consultation. Patient location: home address in [facility]    Physician location: regular office location Other people involved in call: Preceptor  This visit was completed virtually using Doxy. me    This visit was performed during the 7904 public health crisis and COVID-19 pandemic.   *Add 95 modifier to all Video Visits*

## 2021-08-27 ENCOUNTER — CARE COORDINATION (OUTPATIENT)
Dept: CARE COORDINATION | Age: 26
End: 2021-08-27

## 2021-08-27 NOTE — CARE COORDINATION
You Patient resolved from the Care Transitions episode on 8/27/21  Discussed COVID-19 related testing which was available at this time. Test results were positive. Patient informed of results, if available? Yes    Patient/family has been provided the following resources and education related to COVID-19:                         Signs, symptoms and red flags related to COVID-19            CDC exposure and quarantine guidelines            [employer] exposure contact - [phone]            Contact for their local Department of Health                 Patient currently reports that the following symptoms have improved:  fatigue cough      No further outreach scheduled with this CTN/ACM. Episode of Care resolved. Patient has this CTN/ACM contact information if future needs arise.

## 2021-08-29 PROBLEM — U07.1 COVID-19: Status: ACTIVE | Noted: 2021-08-29

## 2021-08-29 ASSESSMENT — ENCOUNTER SYMPTOMS
DIARRHEA: 0
TROUBLE SWALLOWING: 0
SORE THROAT: 0
NAUSEA: 0
ABDOMINAL PAIN: 0
VOMITING: 0
COUGH: 0
CONSTIPATION: 0

## 2021-08-31 ENCOUNTER — TELEPHONE (OUTPATIENT)
Dept: FAMILY MEDICINE CLINIC | Age: 26
End: 2021-08-31

## 2021-08-31 NOTE — TELEPHONE ENCOUNTER
Called to check on patient  Recovering from [place]    She is feeling much better  \"A long eleven days\"  Back to work   Feeling well

## 2021-12-01 ENCOUNTER — PATIENT MESSAGE (OUTPATIENT)
Dept: FAMILY MEDICINE CLINIC | Age: 26
End: 2021-12-01

## 2021-12-01 ENCOUNTER — OFFICE VISIT (OUTPATIENT)
Dept: FAMILY MEDICINE CLINIC | Age: 26
End: 2021-12-01
Payer: COMMERCIAL

## 2021-12-01 VITALS
DIASTOLIC BLOOD PRESSURE: 60 MMHG | BODY MASS INDEX: 25.39 KG/M2 | HEIGHT: 65 IN | TEMPERATURE: 98.7 F | OXYGEN SATURATION: 98 % | SYSTOLIC BLOOD PRESSURE: 106 MMHG | WEIGHT: 152.4 LBS | HEART RATE: 88 BPM | RESPIRATION RATE: 16 BRPM

## 2021-12-01 DIAGNOSIS — M25.532 ACUTE PAIN OF LEFT WRIST: Primary | ICD-10-CM

## 2021-12-01 DIAGNOSIS — M67.432 GANGLION CYST OF DORSUM OF LEFT WRIST: Primary | ICD-10-CM

## 2021-12-01 PROCEDURE — G8419 CALC BMI OUT NRM PARAM NOF/U: HCPCS | Performed by: FAMILY MEDICINE

## 2021-12-01 PROCEDURE — 1036F TOBACCO NON-USER: CPT | Performed by: FAMILY MEDICINE

## 2021-12-01 PROCEDURE — G8484 FLU IMMUNIZE NO ADMIN: HCPCS | Performed by: FAMILY MEDICINE

## 2021-12-01 PROCEDURE — 99214 OFFICE O/P EST MOD 30 MIN: CPT | Performed by: FAMILY MEDICINE

## 2021-12-01 PROCEDURE — G8427 DOCREV CUR MEDS BY ELIG CLIN: HCPCS | Performed by: FAMILY MEDICINE

## 2021-12-01 RX ORDER — NAPROXEN 500 MG/1
500 TABLET ORAL 2 TIMES DAILY WITH MEALS
Qty: 40 TABLET | Refills: 0 | Status: SHIPPED
Start: 2021-12-01 | End: 2022-05-17

## 2021-12-01 ASSESSMENT — ENCOUNTER SYMPTOMS
SHORTNESS OF BREATH: 0
ABDOMINAL PAIN: 0
BLOOD IN STOOL: 0
CHEST TIGHTNESS: 0

## 2021-12-01 NOTE — TELEPHONE ENCOUNTER
From: Jamison Frank  To: Dr. Tio Kern  Sent: 12/1/2021 3:12 PM EST  Subject: Non-Urgent Medical Question    Good after noon, would you be able to give me a call concerning a slight emergency issue with my hand

## 2021-12-01 NOTE — PROGRESS NOTES
21  Laney Olmstead : 1995 Sex: female  Age: 31 y.o. Chief Complaint   Patient presents with    Wrist Pain     left wrist, lump inside wrist of left arm, painful to move thumb and wrist, x 2 days       HPI this pleasant 50-year-old woman is here today complaining of a painful lump on her left wrist.  She thinks it may have been there for a while but only began hurting the last couple days. On exam this appears to be a ganglion cyst and I gave her referral to orthopedics. Review of Systems   Constitutional: Negative for diaphoresis and unexpected weight change. Eyes: Negative for visual disturbance. Respiratory: Negative for chest tightness and shortness of breath. Cardiovascular: Negative for chest pain and palpitations. Gastrointestinal: Negative for abdominal pain and blood in stool. Genitourinary: Negative for flank pain. Musculoskeletal: Positive for arthralgias. Negative for neck pain and neck stiffness. Neurological: Negative for seizures, syncope and speech difficulty. Physical Exam  Vitals reviewed. Constitutional:       Appearance: Normal appearance. HENT:      Head: Normocephalic. Mouth/Throat:      Mouth: Mucous membranes are moist.   Cardiovascular:      Rate and Rhythm: Normal rate and regular rhythm. Pulmonary:      Breath sounds: Normal breath sounds. Abdominal:      Palpations: Abdomen is soft. Musculoskeletal:      Left wrist: Swelling and tenderness present. Comments: There is a solid cyst that appears to be a ganglion cyst on the left wrist near the de Quervain's area. There is pain with thumb movements. Skin:     General: Skin is warm and dry. Neurological:      Mental Status: She is alert and oriented to person, place, and time. Psychiatric:         Mood and Affect: Mood normal.         Behavior: Behavior normal.         Assessment and Plan:  Edith Doran was seen today for wrist pain.     Diagnoses and all orders for this visit:    Ganglion cyst of dorsum of left wrist  -     Kristin Escalera MD, Orthopaedics (hand and upper extremities), [place] ([facility])    Other orders  -     naproxen (NAPROSYN) 500 MG tablet; Take 1 tablet by mouth 2 times daily (with meals)          Discussions/Education provided to patients during visit:  [] Discussed the importance to stop smoking. [] Advised to monitor eating habits. [] Reviewed and discussed Imaging results. [] Reviewed and discussed Lab results. [] Discussed the importance of drinking plenty of fluids. [] Cut down on Salt, Caffeine, and Sugar. [x] Continue Medications as Discussed. [x] Communicated with patient any concerns, to phone office. No follow-ups on file.       Seen By:  Mac Foster, DO

## 2021-12-01 NOTE — TELEPHONE ENCOUNTER
Reports that she is having pain and swelling in hand/ wrist  Was told that it was possibly a ganglion cyst  But it was red and hot to touch  Was having shooting pains in hands/ wrist  She is left handed  Directly on the wrist  Added some labs  She has referral to ortho that she will f/u with tomorro(may not be covered by insurance)

## 2022-02-24 ENCOUNTER — APPOINTMENT (OUTPATIENT)
Dept: GENERAL RADIOLOGY | Age: 27
End: 2022-02-24
Payer: COMMERCIAL

## 2022-02-24 ENCOUNTER — HOSPITAL ENCOUNTER (EMERGENCY)
Age: 27
Discharge: HOME | End: 2022-02-24
Attending: EMERGENCY MEDICINE
Payer: COMMERCIAL

## 2022-02-24 VITALS
WEIGHT: 150 LBS | BODY MASS INDEX: 25.35 KG/M2 | DIASTOLIC BLOOD PRESSURE: 66 MMHG | TEMPERATURE: 98.2 F | SYSTOLIC BLOOD PRESSURE: 124 MMHG | OXYGEN SATURATION: 100 % | HEART RATE: 78 BPM | RESPIRATION RATE: 18 BRPM

## 2022-02-24 DIAGNOSIS — S62.609A CLOSED DISPLACED FRACTURE OF PHALANX OF FINGER, UNSPECIFIED FINGER, UNSPECIFIED PHALANX, INITIAL ENCOUNTER: Primary | ICD-10-CM

## 2022-02-24 PROCEDURE — 73130 X-RAY EXAM OF HAND: CPT

## 2022-02-24 PROCEDURE — 99282 EMERGENCY DEPT VISIT SF MDM: CPT

## 2022-02-24 NOTE — ED PROVIDER NOTES
HPI:  2/24/22, Time: 12:16 PM MONIKA Klein is a 31 y.o. female presenting to the ED for finger fracture. Patient states she hurt her finger at the beginning of last month. She was told it was fractured. She was placed in a splint. She says she may take this but off too early. She says it has not healed correctly. It does continue to 8, there is a persistent deformity. She is able to move it, but is uncomfortable. Denies any new injury or trauma. Denies any nausea, vomiting, fever, chills, lethargy, paresthesias, or any other symptoms or complaints. Review of Systems:   A complete review of systems was performed and pertinent positives and negatives are stated within HPI, all other systems reviewed and are negative.          --------------------------------------------- PAST HISTORY ---------------------------------------------  Past Medical History:  has a past medical history of Abscess of bursa of wrist, Open wound of knee, leg, and ankle, and Spongiotic dermatitis. Past Surgical History:  has a past surgical history that includes Wrist surgery. Social History:  reports that she has never smoked. She has never used smokeless tobacco. She reports that she does not drink alcohol and does not use drugs. Family History: family history is not on file. The patients home medications have been reviewed. Allergies: Seasonal    -------------------------------------------------- RESULTS -------------------------------------------------  All laboratory and radiology results have been personally reviewed by myself   LABS:  No results found for this visit on 02/24/22.     RADIOLOGY:  Interpreted by Radiologist.  XR HAND RIGHT (MIN 3 VIEWS)   Final Result   1. 4.5 mm volar plate avulsion fracture of the middle phalanx of the right   5th digit.             ------------------------- NURSING NOTES AND VITALS REVIEWED ---------------------------   The nursing notes within the ED encounter and vital signs as below have been reviewed. BP (!) 117/50   Pulse 77   Temp 97.6 °F (36.4 °C)   Resp 20   Wt 150 lb (68 kg)   SpO2 99%   BMI 25.35 kg/m²   Oxygen Saturation Interpretation: Normal      ---------------------------------------------------PHYSICAL EXAM--------------------------------------      Constitutional/General: Alert and oriented x3, well appearing, non toxic in NAD  Head: Normocephalic and atraumatic  Eyes: PERRL, EOMI  Mouth: Oropharynx clear, handling secretions, no trismus  Neck: Supple, full ROM,   Pulmonary: Lungs clear to auscultation bilaterally, no wheezes, rales, or rhonchi. Not in respiratory distress  Cardiovascular:  Regular rate and rhythm, no murmurs, gallops, or rubs. 2+ distal pulses  Abdomen: Soft, non tender, non distended,   Extremities: Moves all extremities x 4. Warm and well perfused. Left fifth finger deformity noted at the DIP joint, range of motion is limited due to pain, no erythema, no warmth, minimal tenderness  Skin: warm and dry without rash  Neurologic: GCS 15,  Psych: Normal Affect      ------------------------------ ED COURSE/MEDICAL DECISION MAKING----------------------  Medications - No data to display      ED COURSE:       Medical Decision Making:    Imaging reviewed. Patient's fracture is from about a month and a half ago per her report. She will be placed in a finger foam sling. She is to follow-up with orthopedic surgery, she says she is known to Dr. Mary Green, she is to follow-up with them, she is educated on signs and symptoms that require emergent evaluation. Counseling: The emergency provider has spoken with the patient and discussed todays results, in addition to providing specific details for the plan of care and counseling regarding the diagnosis and prognosis.   Questions are answered at this time and they are agreeable with the plan.      --------------------------------- IMPRESSION AND DISPOSITION ---------------------------------    IMPRESSION  1. Closed displaced fracture of phalanx of finger, unspecified finger, unspecified phalanx, initial encounter        DISPOSITION  Disposition: Discharge to home  Patient condition is stable      NOTE: This report was transcribed using voice recognition software.  Every effort was made to ensure accuracy; however, inadvertent computerized transcription errors may be present        Mariely Stevens MD  02/24/22 8050

## 2022-02-24 NOTE — ED NOTES
Reviewed discharge instructions with patient, all questions answered. Patient verbalized understanding and provided signature on paperwork. Patient ambulated on own towards exit.        Maryann Nava RN  02/24/22 [id number]

## 2022-05-17 ENCOUNTER — OFFICE VISIT (OUTPATIENT)
Dept: FAMILY MEDICINE CLINIC | Age: 27
End: 2022-05-17
Payer: COMMERCIAL

## 2022-05-17 VITALS
WEIGHT: 150 LBS | BODY MASS INDEX: 25.61 KG/M2 | OXYGEN SATURATION: 96 % | SYSTOLIC BLOOD PRESSURE: 134 MMHG | TEMPERATURE: 98 F | HEIGHT: 64 IN | HEART RATE: 77 BPM | RESPIRATION RATE: 20 BRPM | DIASTOLIC BLOOD PRESSURE: 82 MMHG

## 2022-05-17 DIAGNOSIS — J30.2 SEASONAL ALLERGIES: ICD-10-CM

## 2022-05-17 DIAGNOSIS — L70.0 ACNE VULGARIS: Primary | ICD-10-CM

## 2022-05-17 PROBLEM — U07.1 COVID-19: Status: RESOLVED | Noted: 2021-08-29 | Resolved: 2022-05-17

## 2022-05-17 PROCEDURE — 1036F TOBACCO NON-USER: CPT | Performed by: FAMILY MEDICINE

## 2022-05-17 PROCEDURE — G8419 CALC BMI OUT NRM PARAM NOF/U: HCPCS | Performed by: FAMILY MEDICINE

## 2022-05-17 PROCEDURE — 99213 OFFICE O/P EST LOW 20 MIN: CPT | Performed by: FAMILY MEDICINE

## 2022-05-17 PROCEDURE — G8427 DOCREV CUR MEDS BY ELIG CLIN: HCPCS | Performed by: FAMILY MEDICINE

## 2022-05-17 RX ORDER — FLUTICASONE PROPIONATE 50 MCG
2 SPRAY, SUSPENSION (ML) NASAL DAILY
Qty: 2 EACH | Refills: 1 | Status: SHIPPED
Start: 2022-05-17 | End: 2022-07-21

## 2022-05-17 RX ORDER — ADAPALENE 45 G/G
GEL TOPICAL
Qty: 45 G | Refills: 4 | Status: SHIPPED | OUTPATIENT
Start: 2022-05-17 | End: 2022-06-16

## 2022-05-17 RX ORDER — CETIRIZINE HYDROCHLORIDE 10 MG/1
10 TABLET ORAL DAILY
Qty: 90 TABLET | Refills: 1 | Status: SHIPPED
Start: 2022-05-17 | End: 2022-07-21

## 2022-05-17 RX ORDER — CLINDAMYCIN PHOSPHATE 10 MG/G
GEL TOPICAL
Qty: 1 EACH | Refills: 3 | Status: SHIPPED | OUTPATIENT
Start: 2022-05-17 | End: 2022-05-24

## 2022-05-17 SDOH — ECONOMIC STABILITY: FOOD INSECURITY: WITHIN THE PAST 12 MONTHS, THE FOOD YOU BOUGHT JUST DIDN'T LAST AND YOU DIDN'T HAVE MONEY TO GET MORE.: NEVER TRUE

## 2022-05-17 SDOH — ECONOMIC STABILITY: FOOD INSECURITY: WITHIN THE PAST 12 MONTHS, YOU WORRIED THAT YOUR FOOD WOULD RUN OUT BEFORE YOU GOT MONEY TO BUY MORE.: NEVER TRUE

## 2022-05-17 ASSESSMENT — PATIENT HEALTH QUESTIONNAIRE - PHQ9
SUM OF ALL RESPONSES TO PHQ QUESTIONS 1-9: 0
1. LITTLE INTEREST OR PLEASURE IN DOING THINGS: 0
SUM OF ALL RESPONSES TO PHQ QUESTIONS 1-9: 0
SUM OF ALL RESPONSES TO PHQ9 QUESTIONS 1 & 2: 0
SUM OF ALL RESPONSES TO PHQ QUESTIONS 1-9: 0
2. FEELING DOWN, DEPRESSED OR HOPELESS: 0
SUM OF ALL RESPONSES TO PHQ QUESTIONS 1-9: 0

## 2022-05-17 ASSESSMENT — SOCIAL DETERMINANTS OF HEALTH (SDOH): HOW HARD IS IT FOR YOU TO PAY FOR THE VERY BASICS LIKE FOOD, HOUSING, MEDICAL CARE, AND HEATING?: NOT HARD AT ALL

## 2022-05-17 ASSESSMENT — ENCOUNTER SYMPTOMS
SORE THROAT: 0
CONSTIPATION: 0
DIARRHEA: 0
VOMITING: 0
TROUBLE SWALLOWING: 0
COUGH: 0
NAUSEA: 0
WHEEZING: 0
SHORTNESS OF BREATH: 0

## 2022-05-17 NOTE — PROGRESS NOTES
[facility] [address]  Precepting Note    Subjective:  Seasonal allergies  Controlled on flonase and zyrtec. Would like refills. Using otc flonase. Acne  Forehead and cheeks mostly. Some on chin. Mildly cystic. Has tried no rx txs. Has tried otc benzyl peroxide, pretty much all the otcs. Was interested in talking about accutane. ROS otherwise negative     Past medical, surgical, family and social history were reviewed, non-contributory, and unchanged unless otherwise stated. Objective:    /82   Pulse 77   Temp 98 °F (36.7 °C) (Temporal)   Resp 20   Ht 5' 4\" (1.626 m)   Wt 150 lb (68 kg)   SpO2 96%   BMI 25.75 kg/m²     Exam is as noted by resident with the following changes, additions or corrections:    General:  NAD; alert & oriented x 3   Heart:  RRR, no murmurs, gallops, or rubs. Lungs:  CTA bilaterally, no wheeze, rales or rhonchi  Abd: bowel sounds present, nontender, nondistended, no masses  Extrem:  No clubbing, cyanosis, or edema    Assessment/Plan:  Acne  Clindamycin in the morning, Differin at night. Seasonal allergies  Continue flonase and zyrtec       Attending Physician Statement  I have reviewed the chart, including any radiology or labs. I have discussed the case, including pertinent history and exam findings with the resident. I agree with the assessment, plan and orders as documented by the resident. Please refer to the resident note for additional information.       Electronically signed by Damian Ch MD on 5/17/2022 at 11:33 AM

## 2022-05-17 NOTE — PROGRESS NOTES
5/17/2022    Mely Rendon is a 28 y.o. female here for:    HPI:    Acne: She has tried various over-the-counter options but never any prescription options for her acne. She does have some pustules but no gigantic cysts. Some scarring on the cheeks as well. The cheeks are the most affected area but she does have acne on her chin and forehead as well. No significant body acne. Seasonal: Overall stable. [place] they are flared up a little bit. She needs a refill on her Zyrtec and Flonase. When she takes both of these medications her allergies are well controlled. BP Readings from Last 3 Encounters:   05/17/22 134/82   02/24/22 124/66   12/01/21 106/60     Current Outpatient Medications   Medication Sig Dispense Refill    cetirizine (ZYRTEC ALLERGY) 10 MG tablet Take 1 tablet by mouth daily 90 tablet 1    fluticasone (FLONASE) 50 MCG/ACT nasal spray 2 sprays by Each Nostril route daily 2 each 1    clindamycin (CLEOCIN-T) 1 % gel Apply topically in the morning 1 each 3    adapalene (DIFFERIN) 0.1 % gel Apply topically nightly. 45 g 4     No current facility-administered medications for this visit. 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     Social History:  Social History     Tobacco Use    Smoking status: Never Smoker    Smokeless tobacco: Never Used   Substance Use Topics    Alcohol use: No       Immunization History   Administered Date(s) Administered    HIB PRP-T (ActHIB, Hiberix) 1995, 1995, 1994, 09/26/1995    Hepatitis B Ped/Adol (Engerix-B, Recombivax HB) 1995, 1995, 1994    Influenza Virus Vaccine 04/01/2016    Influenza, Michelle Tisha, IM, PF (6 mo and older Fluzone, Flulaval, Fluarix, and 3 yrs and older Afluria) 09/23/2020    MMR 06/27/1995, 06/09/1998    PPD Test 04/01/2016, 09/01/2020, 09/16/2020, 09/23/2020    Polio IPV (IPOL) 1994, 1994, 09/26/1995, 06/09/1998    Td vaccine (adult) 04/04/2005    Tdap (Boostrix, Adacel) 08/29/2012, 09/01/2020       Review of Systems   Constitutional: Negative for chills and fever. HENT: Negative for sore throat and trouble swallowing. Respiratory: Negative for cough, shortness of breath and wheezing. Cardiovascular: Negative for chest pain, palpitations and leg swelling. Gastrointestinal: Negative for constipation, diarrhea, nausea and vomiting. Genitourinary: Negative for dysuria and hematuria. Skin: Positive for rash (Acne). Neurological: Negative for light-headedness and headaches. VS:  /82   Pulse 77   Temp 98 °F (36.7 °C) (Temporal)   Resp 20   Ht 5' 4\" (1.626 m)   Wt 150 lb (68 kg)   SpO2 96%   BMI 25.75 kg/m²     Physical Exam  Vitals reviewed. Constitutional:       General: She is not in acute distress. Appearance: Normal appearance. She is not ill-appearing. HENT:      Head: Normocephalic and atraumatic. Cardiovascular:      Rate and Rhythm: Normal rate and regular rhythm. Pulses: Normal pulses. Heart sounds: Normal heart sounds. No murmur heard. Pulmonary:      Effort: Pulmonary effort is normal. No respiratory distress. Breath sounds: No wheezing, rhonchi or rales. Musculoskeletal:         General: Normal range of motion. Cervical back: Normal range of motion. Right lower leg: No edema. Left lower leg: No edema. Skin:     General: Skin is warm and dry. Findings: Rash (Moderate acne vulgaris noted on cheeks bilaterally as well as forehead, some mild acne on the chin) present.    Neurological:      General: No focal deficit present. Mental Status: She is alert. Assessment/Plan:    1. Acne vulgaris  We will start dual regimen with topical antibiotic in the morning and topical retinoid in the evening  Do not feel an oral antibiotic would be warranted at this time however if is not controlled with the topical antibiotic would add oral minocycline  Patient was requesting Accutane however this would have to go through dermatology   If no success with the above would refer to dermatology  - clindamycin (CLEOCIN-T) 1 % gel; Apply topically in the morning  Dispense: 1 each; Refill: 3  - adapalene (DIFFERIN) 0.1 % gel; Apply topically nightly. Dispense: 45 g; Refill: 4    2. Seasonal allergies  Overall stable  Refill antihistamine and intranasal steroid  - cetirizine (ZYRTEC ALLERGY) 10 MG tablet; Take 1 tablet by mouth daily  Dispense: 90 tablet; Refill: 1  - fluticasone (FLONASE) 50 MCG/ACT nasal spray; 2 sprays by Each Nostril route daily  Dispense: 2 each; Refill: 1      Follow up: With PCP    Patient agrees with the above stated plan.       Jessica Queen MD  PGY-3 Family Medicine

## 2022-05-20 ENCOUNTER — TELEPHONE (OUTPATIENT)
Dept: FAMILY MEDICINE CLINIC | Age: 27
End: 2022-05-20

## 2022-05-20 NOTE — TELEPHONE ENCOUNTER
Differin gel is not covered by patient's insurance. Attempted prior authorization on the Differin gel. Insurance has still denied. Spoke with the pharmacist, she tried to run it a couple different ways. Insurance still will not cover.      Please advise

## 2022-05-24 NOTE — TELEPHONE ENCOUNTER
This may be because it is now available otc    You can offer her a visit or you can recommend she purchase OTC

## 2022-07-11 ENCOUNTER — OFFICE VISIT (OUTPATIENT)
Dept: FAMILY MEDICINE CLINIC | Age: 27
End: 2022-07-11
Payer: COMMERCIAL

## 2022-07-11 VITALS
HEART RATE: 67 BPM | DIASTOLIC BLOOD PRESSURE: 68 MMHG | TEMPERATURE: 97.6 F | HEIGHT: 65 IN | BODY MASS INDEX: 25.26 KG/M2 | SYSTOLIC BLOOD PRESSURE: 110 MMHG | RESPIRATION RATE: 16 BRPM | WEIGHT: 151.6 LBS | OXYGEN SATURATION: 98 %
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
## 2022-07-11 DIAGNOSIS — S90.862A INSECT BITE OF LEFT FOOT, INITIAL ENCOUNTER: Primary | ICD-10-CM

## 2022-07-11 DIAGNOSIS — L03.032 CELLULITIS OF TOE OF LEFT FOOT: ICD-10-CM

## 2022-07-11 PROCEDURE — G8419 CALC BMI OUT NRM PARAM NOF/U: HCPCS | Performed by: NURSE PRACTITIONER

## 2022-07-11 PROCEDURE — G8427 DOCREV CUR MEDS BY ELIG CLIN: HCPCS | Performed by: NURSE PRACTITIONER

## 2022-07-11 PROCEDURE — 99213 OFFICE O/P EST LOW 20 MIN: CPT | Performed by: NURSE PRACTITIONER

## 2022-07-11 PROCEDURE — 1036F TOBACCO NON-USER: CPT | Performed by: NURSE PRACTITIONER

## 2022-07-11 RX ORDER — CEPHALEXIN 500 MG/1
500 CAPSULE ORAL 4 TIMES DAILY
Qty: 28 CAPSULE | Refills: 0 | Status: SHIPPED | OUTPATIENT
Start: 2022-07-11 | End: 2022-07-18

## 2022-07-11 RX ORDER — METHYLPREDNISOLONE 4 MG/1
TABLET ORAL
Qty: 1 KIT | Refills: 0 | Status: SHIPPED
Start: 2022-07-11 | End: 2022-07-21

## 2022-07-11 NOTE — PROGRESS NOTES
22  Jamison Frank : 1995 Sex: female  Age 28 y.o. Subjective:  Chief Complaint   Patient presents with    Insect Bite     bee sting on left foot, swollen, itches, x 3 days, near toes       HPI:   Jamison Frank , 28 y.o. female presents to the clinic for evaluation of insect bite to left 3rd toe x 2 days. The patient also reports the area is warm to touch, mild tenderness, and swollen. No bleeding or active drainage. The patient has taken benadryl for symptoms. The patient reports worsening symptoms over time. The patient also denies fever, lymphogenic streaking, myalgia, arthralgia, chills, and lethargy. The patient also denies headache, chest pain, abdominal pain, shortness of breath, and nausea / vomiting / diarrhea. ROS:   Unless otherwise stated in this report the patient's positive and negative responses for review of systems for constitutional, eyes, ENT, cardiovascular, respiratory, gastrointestinal, neurological, , musculoskeletal, and integument systems and related systems to the presenting problem are either stated in the history of present illness or were not pertinent or were negative for the symptoms and/or complaints related to the presenting medical problem. Positives and pertinent negatives as per HPI. All others reviewed and are negative. PMH:     Past Medical History:   Diagnosis Date    Abscess of bursa of wrist 2014    COVID-19 2021    Open wound of knee, leg, and ankle 2007    Spongiotic dermatitis 2017       Past Surgical History:   Procedure Laterality Date    WRIST SURGERY         History reviewed. No pertinent family history.     Medications:     Current Outpatient Medications:     methylPREDNISolone (MEDROL DOSEPACK) 4 MG tablet, Take by mouth., Disp: 1 kit, Rfl: 0    cephALEXin (KEFLEX) 500 MG capsule, Take 1 capsule by mouth 4 times daily for 7 days, Disp: 28 capsule, Rfl: 0    cetirizine (ZYRTEC ALLERGY) 10 MG tablet, Take 1 tablet by mouth daily, Disp: 90 tablet, Rfl: 1    fluticasone (FLONASE) 50 MCG/ACT nasal spray, 2 sprays by Each Nostril route daily, Disp: 2 each, Rfl: 1    Allergies: Allergies   Allergen Reactions    Seasonal      itchy watery eyes congestion       Social History:     Social History     Tobacco Use    Smoking status: Never Smoker    Smokeless tobacco: Never Used   Vaping Use    Vaping Use: Never used   Substance Use Topics    Alcohol use: No    Drug use: No       Patient lives at home. Physical Exam:     Vitals:    07/11/22 1255   BP: 110/68   Pulse: 67   Resp: 16   Temp: 97.6 °F (36.4 °C)   TempSrc: Infrared   SpO2: 98%   Weight: 151 lb 9.6 oz (68.8 kg)   Height: 5' 4.5\" (1.638 m)       Physical Exam (PE)   Constitutional: Alert, development consistent with age. HENT:      Head: Normocephalic. Right Ear: External ear normal.      Left Ear: External ear normal.      Nose: Normal.      Mouth/Throat:     Mouth: Mucous membranes are moist.      Pharynx: Oropharynx is clear. Eyes: Pupils: Pupils are equal, round, and reactive to light. Neck: Normal ROM. Supple. Cardiovascular: Heart RRR without pathologic murmurs or gallops. Pulmonary: Respiratory effort normal.  Normal breath sounds. Abdomen: Soft, nontender, normal bowel sounds. Skin:  Normal turgor and appropriately dry to touch. Erythematous, indurated region over the plantar aspect of the left 3rd phalanx. Moderate TTP and warmth over the same area. No foreign body, bleeding, or drainage. No lymphangitic streaking. No fluctuance noted. Musculoskeletal: General: Normal strength / ROM. Neurological:  Orientation age-appropriate unless noted elseware. Motor functions intact. Psychiatric: Mood and Affect: Mood normal. Behavior: Behavior normal.    Testing:   (All laboratory and radiology results have been personally reviewed by myself)  Labs:  No results found for this visit on 07/11/22. Imaging:   All Radiology results interpreted by Radiologist unless otherwise noted. No orders to display       Assessment / Plan:   The patient's vitals, allergies, medications, and past medical history have been reviewed. Paul Wu was seen today for insect bite. Diagnoses and all orders for this visit:    Insect bite of left foot, initial encounter  -     methylPREDNISolone (MEDROL DOSEPACK) 4 MG tablet; Take by mouth. Cellulitis of toe of left foot  -     cephALEXin (KEFLEX) 500 MG capsule; Take 1 capsule by mouth 4 times daily for 7 days        - Disposition: Home    - Educational material printed for patient's review and were included in patient instructions. After Visit Summary was given to patient at the end of visit. - Discussed symptomatic treatments with patient today. Advise f/u with PCP in 2-3 days for recheck. ED sooner if symptoms worsen or change. ED immediately with the development of fever, body aches, shaking chills, spreading erythema, lymphangitic streaking, lethargy, CP, or SOB. Pt is in agreement with this care plan. All questions answered. SIGNATURE: ERICKSON Person-CNP    *NOTE: This report was transcribed using voice recognition software. Every effort was made to ensure accuracy; however, inadvertent computerized transcription errors may be present.

## 2022-07-11 NOTE — PATIENT INSTRUCTIONS
Patient Education        Insect Stings and Bites: Care Instructions  Overview  Stings and bites from bees, wasps, ants, and other insects often cause pain, swelling, redness, and itching. In some people, especially children, the redness and swelling may be worse. It may extend several inches beyond the affected area. But in most cases, stings and bites don't cause reactions allover the body. If you have had a reaction to an insect sting or bite, you are at risk for areaction if you get stung or bitten again. Follow-up care is a key part of your treatment and safety. Be sure to make and go to all appointments, and call your doctor if you are having problems. It's also a good idea to know your test results and keep alist of the medicines you take. How can you care for yourself at home?  Do not scratch or rub the skin where the sting or bite occurred.  Put a cold pack or ice cube on the area. Put a thin cloth between the ice and your skin. For some people, a paste of baking soda mixed with a little water helps relieve pain and decrease the reaction.  Take an over-the-counter antihistamine to help relieve swelling, redness, and itching. Calamine lotion or hydrocortisone cream may also help. Do not give antihistamines to your child unless you have checked with the doctor first. Read and follow all instructions on the label.  Be safe with medicines. If your doctor prescribed medicine for your allergy, take it exactly as prescribed. Call your doctor if you think you are having a problem with your medicine. You will get more details on the specific medicines your doctor prescribes.  Your doctor may prescribe a shot of epinephrine to carry with you in case you have a severe reaction. Learn how and when to give yourself the shot, and keep it with you at all times. Make sure it has not .  Go to the emergency room anytime you have a severe reaction.  Go even if you have given yourself epinephrine and are feeling better. Symptoms can come back. When should you call for help? Call 911 anytime you think you may need emergency care. For example, call if:     You have symptoms of a severe allergic reaction. These may include:  ? Sudden raised, red areas (hives) all over your body. ? Swelling of the throat, mouth, lips, or tongue. ? Trouble breathing. ? Passing out (losing consciousness). Or you may feel very lightheaded or suddenly feel weak, confused, or restless. Call your doctor now or seek immediate medical care if:     You have symptoms of an allergic reaction not right at the sting or bite, such as:  ? A rash or small area of hives (raised, red areas on the skin). ? Itching. ? Swelling. ? Belly pain, nausea, or vomiting.      You have a lot of swelling around the site (such as your entire arm or leg is swollen).      You have signs of infection, such as:  ? Increased pain, swelling, redness, or warmth around the sting. ? Red streaks leading from the area. ? Pus draining from the sting. ? A fever. Watch closely for changes in your health, and be sure to contact your doctor if:     You do not get better as expected. Where can you learn more? Go to https://SNAPCARDpeNewgisticseb.GlideTV. org and sign in to your [employer] account. Enter P390 in the [employer] box to learn more about \"Insect Stings and Bites: Care Instructions. \"     If you do not have an account, please click on the \"Sign Up Now\" link. Current as of: March 9, 2022               Content Version: 13.3  © 2006-2022 [employer]. Care instructions adapted under license by [facility] [employer] [facility] ([facility]). If you have questions about a medical condition or this instruction, always ask your healthcare professional. [place] [address] any warranty or liability for your use of this information.          Patient Education        Cellulitis: Care Instructions  Your Care Instructions     Cellulitis is a skin infection caused by bacteria, most often strep or staph. It often occurs after a break in the skin from a scrape, cut, bite, orpuncture, or after a rash. Cellulitis may be treated without doing tests to find out what caused it. But your doctor may do tests, if needed, to look for a specific bacteria, like methicillin-resistant Staphylococcus aureus (MRSA). The doctor has checked you carefully, but problems can develop later. If you notice any problems or new symptoms, get medical treatment right away. Follow-up care is a key part of your treatment and safety. Be sure to make and go to all appointments, and call your doctor if you are having problems. It's also a good idea to know your test results and keep alist of the medicines you take. How can you care for yourself at home?  Take your antibiotics as directed. Do not stop taking them just because you feel better. You need to take the full course of antibiotics.  Prop up the infected area on pillows to reduce pain and swelling. Try to keep the area above the level of your heart as often as you can.  If your doctor told you how to care for your wound, follow your doctor's instructions. If you did not get instructions, follow this general advice:  ? Wash the wound with clean water 2 times a day. Don't use hydrogen peroxide or alcohol, which can slow healing. ? You may cover the wound with a thin layer of petroleum jelly, such as Vaseline, and a nonstick bandage. ? Apply more petroleum jelly and replace the bandage as needed.  Be safe with medicines. Take pain medicines exactly as directed. ? If the doctor gave you a prescription medicine for pain, take it as prescribed. ? If you are not taking a prescription pain medicine, ask your doctor if you can take an over-the-counter medicine. To prevent cellulitis in the future   Try to prevent cuts, scrapes, or other injuries to your skin. Cellulitis most often occurs where there is a break in the skin.    If you get a scrape, cut, mild burn, or bite, wash the wound with clean water as soon as you can to help avoid infection. Don't use hydrogen peroxide or alcohol, which can slow healing.  If you have swelling in your legs (edema), support stockings and good skin care may help prevent leg sores and cellulitis.  Take care of your feet, especially if you have diabetes or other conditions that increase the risk of infection. Wear shoes and socks. Do not go barefoot. If you have athlete's foot or other skin problems on your feet, talk to your doctor about how to treat them. When should you call for help? Call your doctor now or seek immediate medical care if:     You have signs that your infection is getting worse, such as:  ? Increased pain, swelling, warmth, or redness. ? Red streaks leading from the area. ? Pus draining from the area. ? A fever.      You get a rash. Watch closely for changes in your health, and be sure to contact your doctor if:     You do not get better as expected. Where can you learn more? Go to https://Pathfire.ControlCircle. org and sign in to your [employer] account. Enter W054 in the [facility] box to learn more about \"Cellulitis: Care Instructions. \"     If you do not have an account, please click on the \"Sign Up Now\" link. Current as of: November 15, 2021               Content Version: 13.3  © 6519-8483 [employer]. Care instructions adapted under license by [facility] ([facility]). If you have questions about a medical condition or this instruction, always ask your healthcare professional. [address] any warranty or liability for your use of this information.

## 2022-07-21 ENCOUNTER — APPOINTMENT (OUTPATIENT)
Dept: CT IMAGING | Age: 27
End: 2022-07-21
Payer: COMMERCIAL

## 2022-07-21 ENCOUNTER — HOSPITAL ENCOUNTER (EMERGENCY)
Age: 27
Discharge: HOME | End: 2022-07-22
Attending: EMERGENCY MEDICINE
Payer: COMMERCIAL

## 2022-07-21 DIAGNOSIS — R11.2 NON-INTRACTABLE VOMITING WITH NAUSEA, UNSPECIFIED VOMITING TYPE: ICD-10-CM

## 2022-07-21 DIAGNOSIS — R51.9 ACUTE NONINTRACTABLE HEADACHE, UNSPECIFIED HEADACHE TYPE: Primary | ICD-10-CM

## 2022-07-21 DIAGNOSIS — K76.89 LIVER CYST: ICD-10-CM

## 2022-07-21 LAB
ALBUMIN SERPL-MCNC: 4.6 G/DL (ref 3.5–5.2)
ALP BLD-CCNC: 51 U/L (ref 35–104)
ALT SERPL-CCNC: 10 U/L (ref 0–32)
ANION GAP SERPL CALCULATED.3IONS-SCNC: 14 MMOL/L (ref 7–16)
AST SERPL-CCNC: 11 U/L (ref 0–31)
BASOPHILS ABSOLUTE: 0.03 E9/L (ref 0–0.2)
BASOPHILS RELATIVE PERCENT: 0.3 % (ref 0–2)
BILIRUB SERPL-MCNC: 0.5 MG/DL (ref 0–1.2)
BILIRUBIN URINE: NEGATIVE
BLOOD, URINE: NEGATIVE
BUN BLDV-MCNC: 8 MG/DL (ref 6–20)
CALCIUM SERPL-MCNC: 9.6 MG/DL (ref 8.6–10.2)
CHLORIDE BLD-SCNC: 106 MMOL/L (ref 98–107)
CLARITY: CLEAR
CO2: 21 MMOL/L (ref 22–29)
COLOR: YELLOW
CREAT SERPL-MCNC: 0.9 MG/DL (ref 0.5–1)
EOSINOPHILS ABSOLUTE: 0.03 E9/L (ref 0.05–0.5)
EOSINOPHILS RELATIVE PERCENT: 0.3 % (ref 0–6)
GFR AFRICAN AMERICAN: >60
GFR NON-AFRICAN AMERICAN: >60 ML/MIN/1.73
GLUCOSE BLD-MCNC: 105 MG/DL (ref 74–99)
GLUCOSE URINE: NEGATIVE MG/DL
HCG, URINE, POC: NEGATIVE
HCT VFR BLD CALC: 33.1 % (ref 34–48)
HEMOGLOBIN: 11.2 G/DL (ref 11.5–15.5)
IMMATURE GRANULOCYTES #: 0.03 E9/L
IMMATURE GRANULOCYTES %: 0.3 % (ref 0–5)
KETONES, URINE: 40 MG/DL
LACTIC ACID: 1.7 MMOL/L (ref 0.5–2.2)
LEUKOCYTE ESTERASE, URINE: NEGATIVE
LIPASE: 17 U/L (ref 13–60)
LYMPHOCYTES ABSOLUTE: 2.25 E9/L (ref 1.5–4)
LYMPHOCYTES RELATIVE PERCENT: 23 % (ref 20–42)
Lab: NORMAL
MCH RBC QN AUTO: 29.8 PG (ref 26–35)
MCHC RBC AUTO-ENTMCNC: 33.8 % (ref 32–34.5)
MCV RBC AUTO: 88 FL (ref 80–99.9)
MONOCYTES ABSOLUTE: 0.64 E9/L (ref 0.1–0.95)
MONOCYTES RELATIVE PERCENT: 6.5 % (ref 2–12)
NEGATIVE QC PASS/FAIL: NORMAL
NEUTROPHILS ABSOLUTE: 6.8 E9/L (ref 1.8–7.3)
NEUTROPHILS RELATIVE PERCENT: 69.6 % (ref 43–80)
NITRITE, URINE: NEGATIVE
PDW BLD-RTO: 13.2 FL (ref 11.5–15)
PH UA: 7.5 (ref 5–9)
PLATELET # BLD: 306 E9/L (ref 130–450)
PMV BLD AUTO: 9.7 FL (ref 7–12)
POSITIVE QC PASS/FAIL: NORMAL
POTASSIUM REFLEX MAGNESIUM: 3.9 MMOL/L (ref 3.5–5)
PROTEIN UA: NEGATIVE MG/DL
RBC # BLD: 3.76 E12/L (ref 3.5–5.5)
SARS-COV-2, NAAT: NOT DETECTED
SODIUM BLD-SCNC: 141 MMOL/L (ref 132–146)
SPECIFIC GRAVITY UA: 1.02 (ref 1–1.03)
TOTAL PROTEIN: 7.9 G/DL (ref 6.4–8.3)
UROBILINOGEN, URINE: 1 E.U./DL
WBC # BLD: 9.8 E9/L (ref 4.5–11.5)

## 2022-07-21 PROCEDURE — 6360000002 HC RX W HCPCS: Performed by: EMERGENCY MEDICINE

## 2022-07-21 PROCEDURE — 99285 EMERGENCY DEPT VISIT HI MDM: CPT

## 2022-07-21 PROCEDURE — 96375 TX/PRO/DX INJ NEW DRUG ADDON: CPT

## 2022-07-21 PROCEDURE — 93005 ELECTROCARDIOGRAM TRACING: CPT | Performed by: PHYSICIAN ASSISTANT

## 2022-07-21 PROCEDURE — 81003 URINALYSIS AUTO W/O SCOPE: CPT

## 2022-07-21 PROCEDURE — 83605 ASSAY OF LACTIC ACID: CPT

## 2022-07-21 PROCEDURE — 6360000004 HC RX CONTRAST MEDICATION: Performed by: RADIOLOGY

## 2022-07-21 PROCEDURE — 87635 SARS-COV-2 COVID-19 AMP PRB: CPT

## 2022-07-21 PROCEDURE — 96374 THER/PROPH/DIAG INJ IV PUSH: CPT

## 2022-07-21 PROCEDURE — 74177 CT ABD & PELVIS W/CONTRAST: CPT

## 2022-07-21 PROCEDURE — 85025 COMPLETE CBC W/AUTO DIFF WBC: CPT

## 2022-07-21 PROCEDURE — 80053 COMPREHEN METABOLIC PANEL: CPT

## 2022-07-21 PROCEDURE — 70450 CT HEAD/BRAIN W/O DYE: CPT

## 2022-07-21 PROCEDURE — 2580000003 HC RX 258: Performed by: EMERGENCY MEDICINE

## 2022-07-21 PROCEDURE — 36415 COLL VENOUS BLD VENIPUNCTURE: CPT

## 2022-07-21 PROCEDURE — 83690 ASSAY OF LIPASE: CPT

## 2022-07-21 RX ORDER — 0.9 % SODIUM CHLORIDE 0.9 %
1000 INTRAVENOUS SOLUTION INTRAVENOUS ONCE
Status: COMPLETED | OUTPATIENT
Start: 2022-07-21 | End: 2022-07-21

## 2022-07-21 RX ORDER — PROCHLORPERAZINE EDISYLATE 5 MG/ML
10 INJECTION INTRAMUSCULAR; INTRAVENOUS ONCE
Status: COMPLETED | OUTPATIENT
Start: 2022-07-21 | End: 2022-07-21

## 2022-07-21 RX ORDER — DIPHENHYDRAMINE HYDROCHLORIDE 50 MG/ML
50 INJECTION INTRAMUSCULAR; INTRAVENOUS ONCE
Status: COMPLETED | OUTPATIENT
Start: 2022-07-21 | End: 2022-07-21

## 2022-07-21 RX ORDER — KETOROLAC TROMETHAMINE 30 MG/ML
30 INJECTION, SOLUTION INTRAMUSCULAR; INTRAVENOUS ONCE
Status: COMPLETED | OUTPATIENT
Start: 2022-07-21 | End: 2022-07-21

## 2022-07-21 RX ADMIN — KETOROLAC TROMETHAMINE 30 MG: 30 INJECTION, SOLUTION INTRAMUSCULAR at 23:03

## 2022-07-21 RX ADMIN — PROCHLORPERAZINE EDISYLATE 10 MG: 5 INJECTION INTRAMUSCULAR; INTRAVENOUS at 20:16

## 2022-07-21 RX ADMIN — SODIUM CHLORIDE 1000 ML: 9 INJECTION, SOLUTION INTRAVENOUS at 20:16

## 2022-07-21 RX ADMIN — DIPHENHYDRAMINE HYDROCHLORIDE 50 MG: 50 INJECTION, SOLUTION INTRAMUSCULAR; INTRAVENOUS at 20:16

## 2022-07-21 RX ADMIN — IOPAMIDOL 75 ML: 755 INJECTION, SOLUTION INTRAVENOUS at 22:12

## 2022-07-21 ASSESSMENT — ENCOUNTER SYMPTOMS
SHORTNESS OF BREATH: 0
VOMITING: 0
EYE REDNESS: 0
NAUSEA: 0
ABDOMINAL PAIN: 1

## 2022-07-21 ASSESSMENT — PAIN SCALES - GENERAL: PAINLEVEL_OUTOF10: 10

## 2022-07-21 NOTE — ED NOTES
Department of Emergency Medicine    FIRST PROVIDER TRIAGE NOTE             Independent MLP           7/21/22  5:12 PM EDT    Date of Encounter: 7/21/22   MRN: [id number]    Vitals:    07/21/22 1711   Pulse: (!) 105   Temp: 98 °F (36.7 °C)   TempSrc: Oral   SpO2: 100%      HPI: Hollace Skiff is a 28 y.o. female who presents to the ED for Abdominal Pain (C/O epigastric pain and a headache that started this morning. )  Denies cp or shortness of breath     ROS: Negative for cp, sob, vomiting, or diarrhea. Physical Exam:   Gen Appearance/Constitutional: alert  CV: regular rate at this exam   GI: tender to palpation- epigastric      Initial Plan of Care: All treatment areas with department are currently occupied. Plan to order/Initiate the following while awaiting opening in ED: labs.     Initial Plan of Care: Initiate Treatment-Testing, Proceed toTreatment Area When Bed Available for ED Attending/MLP to Continue Care    Electronically signed by Crissy Mccann PA-C   DD: 7/21/22       Crissy Mccann PA-C  07/21/22 2766

## 2022-07-22 VITALS
BODY MASS INDEX: 26.12 KG/M2 | RESPIRATION RATE: 16 BRPM | DIASTOLIC BLOOD PRESSURE: 49 MMHG | SYSTOLIC BLOOD PRESSURE: 99 MMHG | TEMPERATURE: 98 F | WEIGHT: 153 LBS | HEART RATE: 84 BPM | OXYGEN SATURATION: 98 % | HEIGHT: 64 IN

## 2022-07-22 LAB
EKG ATRIAL RATE: 67 BPM
EKG P AXIS: 58 DEGREES
EKG P-R INTERVAL: 144 MS
EKG Q-T INTERVAL: 386 MS
EKG QRS DURATION: 82 MS
EKG QTC CALCULATION (BAZETT): 407 MS
EKG R AXIS: 10 DEGREES
EKG T AXIS: 37 DEGREES
EKG VENTRICULAR RATE: 67 BPM

## 2022-07-22 PROCEDURE — 6370000000 HC RX 637 (ALT 250 FOR IP): Performed by: EMERGENCY MEDICINE

## 2022-07-22 RX ORDER — ONDANSETRON 4 MG/1
4 TABLET, ORALLY DISINTEGRATING ORAL EVERY 8 HOURS PRN
Qty: 10 TABLET | Refills: 0 | Status: SHIPPED | OUTPATIENT
Start: 2022-07-22

## 2022-07-22 RX ORDER — ACETAMINOPHEN 500 MG
1000 TABLET ORAL ONCE
Status: COMPLETED | OUTPATIENT
Start: 2022-07-22 | End: 2022-07-22

## 2022-07-22 RX ADMIN — ACETAMINOPHEN 1000 MG: 500 TABLET ORAL at 00:23

## 2022-07-22 ASSESSMENT — PAIN SCALES - GENERAL
PAINLEVEL_OUTOF10: 0
PAINLEVEL_OUTOF10: 3

## 2022-07-22 ASSESSMENT — PAIN DESCRIPTION - LOCATION: LOCATION: HEAD

## 2022-07-22 NOTE — ED PROVIDER NOTES
Chief complaint: Abdominal pain and headache      HPI:  7/21/22, Time: 8:19 PM EDT    HPI               Bishop Cartwright is a 28 y.o. female presenting to the ED for abdominal pain and headache. The history is obtained from the patient as well as patient's medical record. Patient is presenting emergency department with a chief complaint of abdominal pain and headache. The patient states that she began earlier today with a headache. The headache is located in the bilateral temporal region described as a stabbing and throbbing sensation. Is moderate in severity. Nothing makes better. Nothing makes it worse. States that she did take Tylenol as well as Excedrin with no relief. She denies any fevers, neck pain or neck stiffness. She does also complain of epigastric abdominal pain. She describes this as sharp, nonradiating. This moderate in severity. Nothing makes better. Nothing makes it worse. She did have emesis. There is no hematemesis or coffee-ground emesis. She denies any chest pain, shortness of breath, dysuria, diarrhea or constipation. ROS:   Review of Systems   Constitutional:  Negative for chills and fatigue. HENT:  Negative for congestion. Eyes:  Negative for redness. Respiratory:  Negative for shortness of breath. Cardiovascular:  Negative for chest pain. Gastrointestinal:  Positive for abdominal pain. Negative for nausea and vomiting. Genitourinary:  Negative for dysuria. Musculoskeletal:  Negative for arthralgias. Skin:  Negative for rash. Neurological:  Positive for headaches. Negative for light-headedness. Psychiatric/Behavioral:  Negative for confusion.     All other systems reviewed and are negative.    --------------------------------------------- PAST HISTORY ---------------------------------------------  Past Medical History:  has a past medical history of Abscess of bursa of wrist, COVID-19, Open wound of knee, leg, and ankle, and Spongiotic dermatitis. Past Surgical History:  has a past surgical history that includes Wrist surgery. Social History:  reports that she has never smoked. She has never used smokeless tobacco. She reports that she does not drink alcohol and does not use drugs. Family History: family history is not on file. The patients home medications have been reviewed. Allergies: Seasonal    ---------------------------------------------------PHYSICAL EXAM--------------------------------------  Constitutional/General: Alert and oriented x3, well appearing, non toxic in NAD  Head: Normocephalic and atraumatic  Mouth: Oropharynx clear, handling secretions, no trismus  Neck: Supple, full ROM, no meningeal signs  Pulmonary: Lungs clear to auscultation bilaterally, no wheezes, rales, or rhonchi. Not in respiratory distress  Cardiovascular:  Regular rate. Regular rhythm. No murmurs  Chest: no chest wall tenderness  Abdomen: Soft. Mild tenderness to palpation epigastric region, no rebound, rigidity or guarding. Musculoskeletal: Moves all extremities x 4. Warm and well perfused, no clubbing, cyanosis, or edema. Capillary refill <3 seconds  Skin: warm and dry. No rashes. Neurologic: GCS 15, CN 2-12 grossly intact, no focal deficits, symmetric strength 5/5 in the upper and lower extremities bilaterally. Speech normal Normal finger to nos. No drift. Psych: Normal Affect    -------------------------------------------------- RESULTS -------------------------------------------------  I have personally reviewed all laboratory and imaging results for this patient. Results are listed below.      LABS:  Results for orders placed or performed during the hospital encounter of 07/21/22   COVID-19, Rapid    Specimen: Nasopharyngeal Swab   Result Value Ref Range    SARS-CoV-2, NAAT Not Detected Not Detected   CBC with Auto Differential   Result Value Ref Range    WBC 9.8 4.5 - 11.5 E9/L    RBC 3.76 3.50 - 5.50 E12/L    Hemoglobin 11.2 (L) 11.5 - 15.5 g/dL    Hematocrit 33.1 (L) 34.0 - 48.0 %    MCV 88.0 80.0 - 99.9 fL    MCH 29.8 26.0 - 35.0 pg    MCHC 33.8 32.0 - 34.5 %    RDW 13.2 11.5 - 15.0 fL    Platelets 813 543 - 071 E9/L    MPV 9.7 7.0 - 12.0 fL    Neutrophils % 69.6 43.0 - 80.0 %    Immature Granulocytes % 0.3 0.0 - 5.0 %    Lymphocytes % 23.0 20.0 - 42.0 %    Monocytes % 6.5 2.0 - 12.0 %    Eosinophils % 0.3 0.0 - 6.0 %    Basophils % 0.3 0.0 - 2.0 %    Neutrophils Absolute 6.80 1.80 - 7.30 E9/L    Immature Granulocytes # 0.03 E9/L    Lymphocytes Absolute 2.25 1.50 - 4.00 E9/L    Monocytes Absolute 0.64 0.10 - 0.95 E9/L    Eosinophils Absolute 0.03 (L) 0.05 - 0.50 E9/L    Basophils Absolute 0.03 0.00 - 0.20 E9/L   Comprehensive Metabolic Panel w/ Reflex to MG   Result Value Ref Range    Sodium 141 132 - 146 mmol/L    Potassium reflex Magnesium 3.9 3.5 - 5.0 mmol/L    Chloride 106 98 - 107 mmol/L    CO2 21 (L) 22 - 29 mmol/L    Anion Gap 14 7 - 16 mmol/L    Glucose 105 (H) 74 - 99 mg/dL    BUN 8 6 - 20 mg/dL    Creatinine 0.9 0.5 - 1.0 mg/dL    GFR Non-African American >60 >=60 mL/min/1.73    GFR African American >60     Calcium 9.6 8.6 - 10.2 mg/dL    Total Protein 7.9 6.4 - 8.3 g/dL    Albumin 4.6 3.5 - 5.2 g/dL    Total Bilirubin 0.5 0.0 - 1.2 mg/dL    Alkaline Phosphatase 51 35 - 104 U/L    ALT 10 0 - 32 U/L    AST 11 0 - 31 U/L   Lipase   Result Value Ref Range    Lipase 17 13 - 60 U/L   Lactic Acid   Result Value Ref Range    Lactic Acid 1.7 0.5 - 2.2 mmol/L   Urinalysis   Result Value Ref Range    Color, UA Yellow Straw/Yellow    Clarity, UA Clear Clear    Glucose, Ur Negative Negative mg/dL    Bilirubin Urine Negative Negative    Ketones, Urine 40 (A) Negative mg/dL    Specific Gravity, UA 1.020 1.005 - 1.030    Blood, Urine Negative Negative    pH, UA 7.5 5.0 - 9.0    Protein, UA Negative Negative mg/dL    Urobilinogen, Urine 1.0 <2.0 E.U./dL    Nitrite, Urine Negative Negative    Leukocyte Esterase, Urine Negative Negative   POC Pregnancy Urine Qual   Result Value Ref Range    HCG, Urine, POC Negative Negative    Lot Number [id number]     Positive QC Pass/Fail Pass     Negative QC Pass/Fail Pass    EKG 12 Lead   Result Value Ref Range    Ventricular Rate 67 BPM    Atrial Rate 67 BPM    P-R Interval 144 ms    QRS Duration 82 ms    Q-T Interval 386 ms    QTc Calculation (Bazett) 407 ms    P Axis 58 degrees    R Axis 10 degrees    T Axis 37 degrees       RADIOLOGY:  Interpreted by Radiologist.  CT ABDOMEN PELVIS W IV CONTRAST Additional Contrast? None   Final Result   1. No acute abnormality is seen in the abdomen or the pelvis. 2. 7 mm hypodensity in the right lobe of the liver is of an unclear etiology. Given patient's young age, it is likely benign etiology (cyst or hemangioma). 3. Status post right L5 spondylolysis. No spondylolisthesis. CT HEAD WO CONTRAST   Final Result   No acute intracranial abnormality. EKG:  This EKG is signed and interpreted by me. Normal sinus rhythm with a 67, no ST segment elevation or depression, MT interval 144 MS, QRS 82 MS,  ms  Comparison: no previous EKG      ------------------------- NURSING NOTES AND VITALS REVIEWED ---------------------------   The nursing notes within the ED encounter and vital signs as below have been reviewed by myself. /75   Pulse 88   Temp 98 °F (36.7 °C) (Oral)   Resp 16   Ht 5' 4\" (1.626 m)   Wt 153 lb (69.4 kg)   SpO2 100%   BMI 26.26 kg/m²   Oxygen Saturation Interpretation: Normal    The patients available past medical records and past encounters were reviewed.         ------------------------------ ED COURSE/MEDICAL DECISION MAKING----------------------  Medications   prochlorperazine (COMPAZINE) injection 10 mg (10 mg IntraVENous Given 7/21/22 2016)   diphenhydrAMINE (BENADRYL) injection 50 mg (50 mg IntraVENous Given 7/21/22 2016)   0.9 % sodium chloride bolus (0 mLs IntraVENous Stopped 7/21/22 2200)   iopamidol (ISOVUE-370) 76 % injection 75 mL (75 mLs IntraVENous Given 7/21/22 2212)   ketorolac (TORADOL) injection 30 mg (30 mg IntraVENous Given 7/21/22 2303)   acetaminophen (TYLENOL) tablet 1,000 mg (1,000 mg Oral Given 7/22/22 0023)             Medical Decision Making:   I, Dr. Vida Ray am the primary physician of record. Aldo Sanchez is a 28 y.o. female who presents to the ED for headache, nausea and vomiting. Differential diagnosis occludes but not limited to COVID-19, intracranial hemorrhage, migraine. Patient does have a history of migraines. Patient no neurologic deficits. Patient no meningeal signs. Patient did have CBC, CMP fairly unremarkable, CT abdomen pelvis with no acute process. Patient did have CT head with no acute process. The patient did have improvement with her symptoms in the emergency department. She is able to tolerate a p.o. challenge. Patient will be discharged follow-up outpatient    Re-Evaluations/Consultations:           Patient bed no acute distress. Results discussed. Patient be discharged. This patient's ED course included: History, physical examination, reevaluation prior to disposition, labs, imaging, IV medication    This patient has remained hemodynamically stable during their ED course. Counseling: The emergency provider has spoken with the patient and discussed todays results, in addition to providing specific details for the plan of care and counseling regarding the diagnosis and prognosis. Questions are answered at this time and they are agreeable with the plan.       --------------------------------- IMPRESSION AND DISPOSITION ---------------------------------    IMPRESSION  1. Acute nonintractable headache, unspecified headache type    2. Non-intractable vomiting with nausea, unspecified vomiting type    3.  Liver cyst        DISPOSITION  Disposition: Discharge to home  Patient condition is stable        NOTE: This report was transcribed using voice recognition software.  Every effort was made to ensure accuracy; however, inadvertent computerized transcription errors may be present         Simpson Rinne, DO  07/22/22 [address] [place] [address], DO  08/15/22 0005

## 2022-07-27 ENCOUNTER — OFFICE VISIT (OUTPATIENT)
Dept: FAMILY MEDICINE CLINIC | Age: 27
End: 2022-07-27
Payer: COMMERCIAL

## 2022-07-27 VITALS
HEIGHT: 65 IN | BODY MASS INDEX: 24.83 KG/M2 | SYSTOLIC BLOOD PRESSURE: 104 MMHG | RESPIRATION RATE: 18 BRPM | TEMPERATURE: 98 F | HEART RATE: 61 BPM | OXYGEN SATURATION: 99 % | DIASTOLIC BLOOD PRESSURE: 64 MMHG | WEIGHT: 149 LBS

## 2022-07-27 DIAGNOSIS — G43.909 MIGRAINE WITHOUT STATUS MIGRAINOSUS, NOT INTRACTABLE, UNSPECIFIED MIGRAINE TYPE: Primary | ICD-10-CM

## 2022-07-27 PROCEDURE — G8419 CALC BMI OUT NRM PARAM NOF/U: HCPCS

## 2022-07-27 PROCEDURE — 99213 OFFICE O/P EST LOW 20 MIN: CPT

## 2022-07-27 PROCEDURE — G8427 DOCREV CUR MEDS BY ELIG CLIN: HCPCS

## 2022-07-27 PROCEDURE — 1036F TOBACCO NON-USER: CPT

## 2022-07-27 RX ORDER — RIZATRIPTAN BENZOATE 10 MG/1
10 TABLET ORAL
Qty: 9 TABLET | Refills: 5 | Status: SHIPPED | OUTPATIENT
Start: 2022-07-27 | End: 2022-07-27

## 2022-07-27 ASSESSMENT — ENCOUNTER SYMPTOMS
SINUS PRESSURE: 0
SINUS PAIN: 0
NAUSEA: 0
BLOOD IN STOOL: 0
EYE REDNESS: 0
COUGH: 0
VOMITING: 0
DIARRHEA: 0
EYE DISCHARGE: 0
SHORTNESS OF BREATH: 0
CHEST TIGHTNESS: 0
EYE PAIN: 0
ABDOMINAL PAIN: 0

## 2022-07-27 NOTE — PROGRESS NOTES
[facility]  Precepting Note    Subjective:  [address] yo F here for ED f/u - had migraine/ n/v  Currently improved but headache did last several days  She had some early satiety and nausea as well  ROS otherwise as per resident note     Past medical, surgical, family and social history were reviewed, non-contributory, and unchanged unless otherwise stated. Objective:    /64   Pulse 61   Temp 98 °F (36.7 °C) (Temporal)   Resp 18   Ht 5' 4.5\" (1.638 m)   Wt 149 lb (67.6 kg)   LMP 07/17/2022 (Approximate)   SpO2 99%   BMI 25.18 kg/m²     Exam is as noted by resident     Assessment/Plan:  Migraine - reviewed options for abortive treatment  Keep headache log/ diary  Start prn maxalt - use at first sign of migraine     Attending Physician Statement  I have reviewed the chart, including any radiology or labs. I have discussed the case, including pertinent history and exam findings with the resident. I agree with the assessment, plan and orders as documented by the resident. Please refer to the resident note for additional information.       Electronically signed by Olga Conti MD on 7/27/2022 at 2:04 PM

## 2022-07-27 NOTE — PROGRESS NOTES
Seasonal  Family History:   No family history on file. Review of Systems:   Review of Systems   Constitutional:  Negative for fatigue, fever and unexpected weight change. HENT:  Negative for ear pain, sinus pressure and sinus pain. Eyes:  Negative for pain, discharge, redness and visual disturbance. Respiratory:  Negative for cough, chest tightness and shortness of breath. Cardiovascular:  Negative for chest pain and leg swelling. Gastrointestinal:  Negative for abdominal pain, blood in stool, diarrhea, nausea and vomiting. Genitourinary:  Negative for difficulty urinating. Musculoskeletal:  Negative for arthralgias, gait problem, myalgias, neck pain and neck stiffness. Skin:  Negative for wound. Neurological:  Negative for light-headedness, numbness and headaches. Hematological: Negative. Psychiatric/Behavioral:  Negative for sleep disturbance and suicidal ideas. PHYSICAL EXAM   Vitals: /64   Pulse 61   Temp 98 °F (36.7 °C) (Temporal)   Resp 18   Ht 5' 4.5\" (1.638 m)   Wt 149 lb (67.6 kg)   LMP 07/17/2022 (Approximate)   SpO2 99%   BMI 25.18 kg/m²   Physical Exam  Vitals reviewed. Constitutional:       Appearance: Normal appearance. HENT:      Head: Normocephalic and atraumatic. Nose: Nose normal. No congestion or rhinorrhea. Mouth/Throat:      Mouth: Mucous membranes are moist.      Pharynx: Oropharynx is clear. Eyes:      General: No scleral icterus. Extraocular Movements: Extraocular movements intact. Conjunctiva/sclera: Conjunctivae normal.      Pupils: Pupils are equal, round, and reactive to light. Neck:      Vascular: No carotid bruit. Cardiovascular:      Rate and Rhythm: Normal rate and regular rhythm. Heart sounds: Normal heart sounds. No murmur heard. No gallop. Pulmonary:      Effort: Pulmonary effort is normal.      Breath sounds: Normal breath sounds. No wheezing or rales.    Abdominal:      General: Bowel sounds are normal. There is no distension. Palpations: Abdomen is soft. Tenderness: There is no abdominal tenderness. There is no guarding. Musculoskeletal:         General: Normal range of motion. Cervical back: Normal range of motion and neck supple. Right lower leg: No edema. Left lower leg: No edema. Skin:     Coloration: Skin is not jaundiced. Neurological:      General: No focal deficit present. Mental Status: She is alert and oriented to person, place, and time. Psychiatric:         Mood and Affect: Mood normal.         Behavior: Behavior normal.         Thought Content: Thought content normal.         Judgment: Judgment normal.       Lab Results   Component Value Date    LABA1C 5.8 12/17/2014     No results found for: EAG     BP Readings from Last 3 Encounters:   07/27/22 104/64   07/22/22 (!) 99/49   07/11/22 110/68        ASSESSMENT AND PLAN     1. Migraine without status migrainosus, not intractable, unspecified migraine type  -No acute intracranial abnormality on recent CT head WO contrast.  -Photophobia,improved with sleeping in quiet room   -encouraged patient to make a migraine journal on triggers and alleviating factors   -avoid NSAID due to GI concerns   -MAXALT 10mg prescribed , patient advised to take prior to onset of migraine     Incidentaloma ,   continue to monitor for symptoms throughout the years. Counseled regarding above diagnosis, including possible risks and complications, especially if left uncontrolled. Counseled regarding the possible side effects, risks, benefits and alternatives to treatment; patient and/or guardian verbalizes understanding, agrees, feels comfortable with and wishes to proceed with above treatment plan.     Call or go to ED immediately if symptoms worsen or persist. Advised patient to call with any new medication issues, and, as applicable, read all Rx info from pharmacy to assure aware of all possible risks and side effects of medication before taking. Patient and/or guardian given opportunity to ask questions/raise concerns. The patient verbalized comfort and understanding of instructions. I encourage further reading and education about your health conditions. Information on many health conditions is provided by the American Academy of [employer]icians: https://familydoctor. org/  Please bring any questions to me at your next visit. Medication List:    Current Outpatient Medications   Medication Sig Dispense Refill    rizatriptan (MAXALT) 10 MG tablet Take 1 tablet by mouth once as needed for Migraine May repeat in 2 hours if needed 9 tablet 5    ondansetron (ZOFRAN ODT) 4 MG disintegrating tablet Take 1 tablet by mouth every 8 hours as needed for Nausea or Vomiting (Patient not taking: Reported on 7/27/2022) 10 tablet 0     No current facility-administered medications for this visit. All medications has been reviewed with patient. Patient is taking the medications as prescribed and describes no side effects. Return to Office: Return in about 3 months (around 10/27/2022) for for headaches, please bring migraine journal.    This document may have been prepared at least partially through the use of voice recognition software. Although effort is taken to assure the accuracy of this document, it is possible that grammatical, syntax,  or spelling errors may occur.     Scar Campbell MD   Family Medicine Resident Physician PGY-2

## 2023-05-26 ENCOUNTER — OFFICE VISIT (OUTPATIENT)
Dept: FAMILY MEDICINE CLINIC | Age: 28
End: 2023-05-26
Payer: COMMERCIAL

## 2023-05-26 VITALS
SYSTOLIC BLOOD PRESSURE: 114 MMHG | DIASTOLIC BLOOD PRESSURE: 68 MMHG | HEART RATE: 65 BPM | WEIGHT: 155 LBS | BODY MASS INDEX: 26.46 KG/M2 | HEIGHT: 64 IN

## 2023-05-26 DIAGNOSIS — Z00.00 ENCOUNTER FOR WELL ADULT EXAM WITHOUT ABNORMAL FINDINGS: Primary | ICD-10-CM

## 2023-05-26 PROCEDURE — 99395 PREV VISIT EST AGE 18-39: CPT | Performed by: FAMILY MEDICINE

## 2023-05-26 RX ORDER — FLUTICASONE PROPIONATE 50 MCG
1 SPRAY, SUSPENSION (ML) NASAL DAILY
COMMUNITY

## 2023-05-26 ASSESSMENT — PATIENT HEALTH QUESTIONNAIRE - PHQ9
1. LITTLE INTEREST OR PLEASURE IN DOING THINGS: 0
SUM OF ALL RESPONSES TO PHQ QUESTIONS 1-9: 0
SUM OF ALL RESPONSES TO PHQ9 QUESTIONS 1 & 2: 0
2. FEELING DOWN, DEPRESSED OR HOPELESS: 0
SUM OF ALL RESPONSES TO PHQ QUESTIONS 1-9: 0

## 2023-05-31 ASSESSMENT — ENCOUNTER SYMPTOMS
BLOOD IN STOOL: 0
DIARRHEA: 0
COUGH: 0
SORE THROAT: 0
SHORTNESS OF BREATH: 0
CONSTIPATION: 0
NAUSEA: 0
WHEEZING: 0
ABDOMINAL PAIN: 0
VOMITING: 0

## 2023-06-20 ENCOUNTER — OFFICE VISIT (OUTPATIENT)
Dept: FAMILY MEDICINE CLINIC | Age: 28
End: 2023-06-20
Payer: COMMERCIAL

## 2023-06-20 VITALS
SYSTOLIC BLOOD PRESSURE: 108 MMHG | HEART RATE: 77 BPM | DIASTOLIC BLOOD PRESSURE: 60 MMHG | HEIGHT: 65 IN | WEIGHT: 152 LBS | OXYGEN SATURATION: 97 % | TEMPERATURE: 98.3 F | BODY MASS INDEX: 25.33 KG/M2

## 2023-06-20 DIAGNOSIS — Z12.4 PAP SMEAR FOR CERVICAL CANCER SCREENING: Primary | ICD-10-CM

## 2023-06-20 DIAGNOSIS — Z11.3 SCREENING EXAMINATION FOR STD (SEXUALLY TRANSMITTED DISEASE): ICD-10-CM

## 2023-06-20 RX ORDER — PNV NO.95/FERROUS FUM/FOLIC AC 28MG-0.8MG
1 TABLET ORAL DAILY
Qty: 30 TABLET | Refills: 2 | Status: SHIPPED | OUTPATIENT
Start: 2023-06-20

## 2023-06-20 SDOH — ECONOMIC STABILITY: INCOME INSECURITY: HOW HARD IS IT FOR YOU TO PAY FOR THE VERY BASICS LIKE FOOD, HOUSING, MEDICAL CARE, AND HEATING?: NOT HARD AT ALL

## 2023-06-20 SDOH — ECONOMIC STABILITY: HOUSING INSECURITY
IN THE LAST 12 MONTHS, WAS THERE A TIME WHEN YOU DID NOT HAVE A STEADY PLACE TO SLEEP OR SLEPT IN A SHELTER (INCLUDING NOW)?: NO

## 2023-06-20 SDOH — ECONOMIC STABILITY: FOOD INSECURITY: WITHIN THE PAST 12 MONTHS, YOU WORRIED THAT YOUR FOOD WOULD RUN OUT BEFORE YOU GOT MONEY TO BUY MORE.: NEVER TRUE

## 2023-06-20 SDOH — ECONOMIC STABILITY: FOOD INSECURITY: WITHIN THE PAST 12 MONTHS, THE FOOD YOU BOUGHT JUST DIDN'T LAST AND YOU DIDN'T HAVE MONEY TO GET MORE.: NEVER TRUE

## 2023-06-20 ASSESSMENT — ENCOUNTER SYMPTOMS
CHEST TIGHTNESS: 0
DIARRHEA: 0
NAUSEA: 0
EYE PAIN: 0
COUGH: 0
EYE DISCHARGE: 0
SINUS PRESSURE: 0
ABDOMINAL PAIN: 0
SINUS PAIN: 0
EYE REDNESS: 0
VOMITING: 0
BLOOD IN STOOL: 0
SHORTNESS OF BREATH: 0

## 2023-06-20 NOTE — PROGRESS NOTES
S: 33 y.o. female with   Chief Complaint   Patient presents with    Gynecologic Exam       28yoF here for PAP. Priors normal.  Last pap ~5y prior. Sexually active, 1 partner, no protection. O: VS:  height is 5' 4.5\" (1.638 m) and weight is 152 lb (68.9 kg). Her temporal temperature is 98.3 °F (36.8 °C). Her blood pressure is 108/60 and her pulse is 77. Her oxygen saturation is 97%. BP Readings from Last 3 Encounters:   06/20/23 108/60   05/26/23 114/68   07/27/22 104/64     See resident note      Impression/Plan:   1) Well Woman/STD screening - PAP collected. Will send pathology if normal will recommend repeat PAP in 3 years. She is not a candidate for HPV co-testing today as she is < 30y. 2) advise prenatal vit      Health Maintenance Due   Topic Date Due    Varicella vaccine (1 of 2 - 2-dose childhood series) Never done    A1C test (Diabetic or Prediabetic)  12/17/2015    Pap smear  07/26/2021    COVID-19 Vaccine (2 - Booster for Nico series) 01/09/2023         Attending Physician Statement  I have discussed the case, including pertinent history and exam findings with the resident. I also have attempted to see the patient and performed key portions of the examination, however she left prior to me being able to see her. .  I agree with the documented assessment and plan.       Shira Carrington MD

## 2023-06-20 NOTE — PROGRESS NOTES
[facility]  Department of Family Medicine  Family Medicine Residency Program      Patient: Shawanda Segovia 33 y.o. female     Date of Service: 6/20/23      Chief complaint:   Chief Complaint   Patient presents with    Gynecologic Exam       HISTORY OF PRESENTING ILLNESS     33 y.o. female presented to the clinic for a well woman exam.     Her last pap smear was 5 years ago and was normal- Low risk screen  Last mammogram was n/a  She is not  up to date with colonoscopy , not applicable, patient has had an EGD for previous stomach complaints. Patient does not have issues with vaginal discharge, itching orodor. Last LMP last week, finished yesterday, normally regular, once a month,   Not on any birth control, no IUD in place. Patient does not have abnormal vaginal bleeding or pink discharge. Patient does not have a family hx of breast, uterine, ovarian or cervical cancers. Patient does not have bowel or bladder problems, currently, she did in the past with constipation. She is not in a committed relationship. She is  sexually active. She has 1 partners and does not use protection. - educated about doing it   She does not smoke. G2,P1,Ab1    No other complaints or concerns today.          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

## 2023-06-21 LAB — SPECIMEN SOURCE: NORMAL

## 2023-06-26 LAB
C TRACH DNA SPEC QL NAA+PROBE: NEGATIVE
N GONORRHOEA DNA SPEC QL NAA+PROBE: NEGATIVE
SPECIMEN SOURCE: NORMAL

## 2023-07-06 RX ORDER — TRETINOIN 1 MG/G
GEL TOPICAL
Qty: 20 G | Refills: 3 | Status: SHIPPED
Start: 2023-07-06 | End: 2023-08-25 | Stop reason: SDUPTHER

## 2023-07-17 ENCOUNTER — NURSE ONLY (OUTPATIENT)
Dept: FAMILY MEDICINE CLINIC | Age: 28
End: 2023-07-17

## 2023-07-19 ENCOUNTER — NURSE ONLY (OUTPATIENT)
Dept: FAMILY MEDICINE CLINIC | Age: 28
End: 2023-07-19

## 2023-07-19 ENCOUNTER — TELEPHONE (OUTPATIENT)
Dept: FAMILY MEDICINE CLINIC | Age: 28
End: 2023-07-19

## 2023-07-19 DIAGNOSIS — R76.11 POSITIVE PPD: Primary | ICD-10-CM

## 2023-07-19 NOTE — TELEPHONE ENCOUNTER
Pt called and would really like to get her chest xray done today if at all possible.       Please let me know when order is in so I can notify the patient     Thank you

## 2023-07-20 ENCOUNTER — HOSPITAL ENCOUNTER (OUTPATIENT)
Age: 28
Discharge: HOME | End: 2023-07-22
Payer: COMMERCIAL

## 2023-07-20 ENCOUNTER — HOSPITAL ENCOUNTER (OUTPATIENT)
Dept: GENERAL RADIOLOGY | Age: 28
Discharge: HOME | End: 2023-07-22
Payer: COMMERCIAL

## 2023-07-20 DIAGNOSIS — R76.11 POSITIVE PPD: ICD-10-CM

## 2023-07-20 PROCEDURE — 71046 X-RAY EXAM CHEST 2 VIEWS: CPT

## 2023-07-28 ENCOUNTER — HOSPITAL ENCOUNTER (OUTPATIENT)
Age: 28
Discharge: HOME | End: 2023-07-28
Payer: COMMERCIAL

## 2023-07-28 DIAGNOSIS — R76.11 POSITIVE PPD: ICD-10-CM

## 2023-07-28 DIAGNOSIS — R76.11 POSITIVE PPD: Primary | ICD-10-CM

## 2023-07-28 PROCEDURE — 86480 TB TEST CELL IMMUN MEASURE: CPT

## 2023-07-28 PROCEDURE — 36415 COLL VENOUS BLD VENIPUNCTURE: CPT

## 2023-07-28 RX ORDER — RIZATRIPTAN BENZOATE 10 MG/1
10 TABLET ORAL
Qty: 9 TABLET | Refills: 5 | Status: SHIPPED | OUTPATIENT
Start: 2023-07-28 | End: 2023-07-28

## 2023-08-02 ENCOUNTER — TELEPHONE (OUTPATIENT)
Dept: FAMILY MEDICINE CLINIC | Age: 28
End: 2023-08-02

## 2023-08-02 DIAGNOSIS — R76.11 POSITIVE PPD: Primary | ICD-10-CM

## 2023-08-02 NOTE — TELEPHONE ENCOUNTER
Please call and clarify  She needs to have the quantiferon test done. If they cannot do it, we may need to have her go to [facility] or another lab.

## 2023-08-02 NOTE — TELEPHONE ENCOUNTER
[facility] called regarding the TB order for patient, they no longer do the [address] TB lab, we need to reorder the lab as just TB test, stated it would pop up when you type TB, they will call the patient to let her know she needs it redrawn, phone number is [phone]

## 2023-08-03 NOTE — TELEPHONE ENCOUNTER
That lab is not available under orders under that name. Please speak to lab supervisor. They should not have canceled a test that simply needed to be run under another name.

## 2023-08-03 NOTE — TELEPHONE ENCOUNTER
Spoke with Johan Baca at the [facility] lab. She said that the order should be under [facility] Spot.

## 2023-08-03 NOTE — TELEPHONE ENCOUNTER
[facility] under TB spot test. The name was recently changed.

## 2023-08-21 ENCOUNTER — TELEPHONE (OUTPATIENT)
Dept: FAMILY MEDICINE CLINIC | Age: 28
End: 2023-08-21

## 2023-08-25 RX ORDER — PNV NO.95/FERROUS FUM/FOLIC AC 28MG-0.8MG
1 TABLET ORAL DAILY
Qty: 30 TABLET | Refills: 3 | Status: SHIPPED | OUTPATIENT
Start: 2023-08-25

## 2023-08-25 RX ORDER — TRETINOIN 1 MG/G
GEL TOPICAL
Qty: 20 G | Refills: 3 | Status: SHIPPED | OUTPATIENT
Start: 2023-08-25

## 2023-08-25 RX ORDER — PNV NO.95/FERROUS FUM/FOLIC AC 28MG-0.8MG
1 TABLET ORAL DAILY
Qty: 30 TABLET | Refills: 3 | Status: SHIPPED
Start: 2023-08-25 | End: 2023-08-25 | Stop reason: SDUPTHER

## 2023-09-06 ENCOUNTER — TELEPHONE (OUTPATIENT)
Dept: FAMILY MEDICINE CLINIC | Age: 28
End: 2023-09-06

## 2023-09-06 NOTE — TELEPHONE ENCOUNTER
LVM to see if test was done or if she still needed it. Asked to call office or leave Reflex message to see what is needed.    Thank you

## 2023-10-02 RX ORDER — TRETINOIN 1 MG/G
GEL TOPICAL
Qty: 20 G | Refills: 3 | OUTPATIENT
Start: 2023-10-02

## 2023-10-02 RX ORDER — PNV NO.95/FERROUS FUM/FOLIC AC 28MG-0.8MG
1 TABLET ORAL DAILY
Qty: 30 TABLET | Refills: 3 | OUTPATIENT
Start: 2023-10-02

## 2023-10-26 RX ORDER — TRETINOIN 1 MG/G
GEL TOPICAL
Qty: 20 G | Refills: 3 | Status: SHIPPED | OUTPATIENT
Start: 2023-10-26

## 2023-10-26 RX ORDER — PNV NO.95/FERROUS FUM/FOLIC AC 28MG-0.8MG
1 TABLET ORAL DAILY
Qty: 30 TABLET | Refills: 3 | Status: SHIPPED | OUTPATIENT
Start: 2023-10-26

## 2023-10-26 NOTE — TELEPHONE ENCOUNTER
Last Appointment   6/20/2023  Next Appointment  Visit date not found    Sent mychart to remind patient to schedule 6 month follow up in dec/duarte

## 2024-01-24 ENCOUNTER — OFFICE VISIT (OUTPATIENT)
Dept: FAMILY MEDICINE CLINIC | Age: 29
End: 2024-01-24
Payer: COMMERCIAL

## 2024-01-24 VITALS
HEIGHT: 65 IN | SYSTOLIC BLOOD PRESSURE: 110 MMHG | DIASTOLIC BLOOD PRESSURE: 70 MMHG | WEIGHT: 161.8 LBS | OXYGEN SATURATION: 98 % | TEMPERATURE: 97.8 F | HEART RATE: 78 BPM | BODY MASS INDEX: 26.96 KG/M2

## 2024-01-24 DIAGNOSIS — K08.89 PAIN, DENTAL: Primary | ICD-10-CM

## 2024-01-24 PROCEDURE — G8419 CALC BMI OUT NRM PARAM NOF/U: HCPCS

## 2024-01-24 PROCEDURE — 1036F TOBACCO NON-USER: CPT

## 2024-01-24 PROCEDURE — G8427 DOCREV CUR MEDS BY ELIG CLIN: HCPCS

## 2024-01-24 PROCEDURE — 99213 OFFICE O/P EST LOW 20 MIN: CPT

## 2024-01-24 PROCEDURE — G8482 FLU IMMUNIZE ORDER/ADMIN: HCPCS

## 2024-01-24 RX ORDER — PNV,CALCIUM 72/IRON/FOLIC ACID 27 MG-1 MG
1 TABLET ORAL DAILY
COMMUNITY
Start: 2023-10-26

## 2024-01-24 RX ORDER — AMOXICILLIN AND CLAVULANATE POTASSIUM 875; 125 MG/1; MG/1
1 TABLET, FILM COATED ORAL 2 TIMES DAILY
Qty: 20 TABLET | Refills: 0 | Status: SHIPPED | OUTPATIENT
Start: 2024-01-24 | End: 2024-02-03

## 2024-01-24 RX ORDER — IBUPROFEN 800 MG/1
800 TABLET ORAL EVERY 8 HOURS PRN
Qty: 15 TABLET | Refills: 0 | Status: SHIPPED | OUTPATIENT
Start: 2024-01-24

## 2024-01-24 NOTE — PROGRESS NOTES
submandibular space. No tongue elevation.  Dental: Moderate tenderness to tapping over to posterior molars without decay noted.  Inflammation, mild erythema to gums at base of teeth on lower left side.  No trismus present.  No facial edema or redness noted.  No drooling.  No caries present and average dentition noted.   Respiratory:  Clear to auscultation and breath sounds equal.    CV: Regular rate and rhythm, normal heart sounds, without pathological murmurs, ectopy, gallops, or rubs.  Skin:  No rashes, erythema or lesions present, unless noted elsewhere..  Lymphatics: No lymphangitis or adenopathy noted.  Neurological:  Alert and oriented.  Motor functions intact.      Lab / Imaging Results   (All laboratory and radiology results have been personally reviewed by myself)  Labs:      Imaging:  All Radiology results interpreted by Radiologist unless otherwise noted.        Assessment / Plan     Impression(s):  Aure was seen today for dental pain.    Diagnoses and all orders for this visit:    Pain, dental  -     amoxicillin-clavulanate (AUGMENTIN) 875-125 MG per tablet; Take 1 tablet by mouth 2 times daily for 10 days  -     ibuprofen (IBU) 800 MG tablet; Take 1 tablet by mouth every 8 hours as needed for Pain      Disposition:  Disposition: Discharge to home.     Vital signs, past medical history, medications, and allergies reviewed at visit.    Scripts written for Augmentin and ibuprofen, side effects discussed.     Increase fluids and rest.     Pt advised to schedule a f/u appt with either a private dentist or the [facility] dental clinic within 1 week. ED sooner if symptoms worsen or change. ED immediately with severe/worsening pain, dyspnea, dysphagia, trismus, drooling, fever, or facial edema. Pt states understanding and is in agreement with this care plan. All questions answered.    BETTY Portillo    **This report was transcribed using voice recognition software. Every effort was made to ensure accuracy;

## 2024-02-11 ENCOUNTER — HOSPITAL ENCOUNTER (EMERGENCY)
Age: 29
Discharge: HOME | End: 2024-02-11
Attending: EMERGENCY MEDICINE
Payer: COMMERCIAL

## 2024-02-11 VITALS
HEART RATE: 86 BPM | HEIGHT: 64 IN | TEMPERATURE: 98.7 F | RESPIRATION RATE: 19 BRPM | SYSTOLIC BLOOD PRESSURE: 113 MMHG | OXYGEN SATURATION: 98 % | WEIGHT: 161 LBS | BODY MASS INDEX: 27.49 KG/M2 | DIASTOLIC BLOOD PRESSURE: 66 MMHG

## 2024-02-11 DIAGNOSIS — Z33.1 PREGNANCY AS INCIDENTAL FINDING: ICD-10-CM

## 2024-02-11 DIAGNOSIS — K04.7 DENTAL ABSCESS: Primary | ICD-10-CM

## 2024-02-11 LAB
ALBUMIN SERPL-MCNC: 4.1 G/DL (ref 3.5–5.2)
ALP SERPL-CCNC: 56 U/L (ref 35–104)
ALT SERPL-CCNC: 8 U/L (ref 0–32)
ANION GAP SERPL CALCULATED.3IONS-SCNC: 11 MMOL/L (ref 7–16)
AST SERPL-CCNC: <5 U/L (ref 0–31)
B-HCG SERPL EIA 3RD IS-ACNC: ABNORMAL MIU/ML (ref 0–7)
BASOPHILS # BLD: 0.03 K/UL (ref 0–0.2)
BASOPHILS NFR BLD: 0 % (ref 0–2)
BILIRUB SERPL-MCNC: 0.6 MG/DL (ref 0–1.2)
BUN SERPL-MCNC: 10 MG/DL (ref 6–20)
CALCIUM SERPL-MCNC: 9.3 MG/DL (ref 8.6–10.2)
CHLORIDE SERPL-SCNC: 102 MMOL/L (ref 98–107)
CO2 SERPL-SCNC: 22 MMOL/L (ref 22–29)
CREAT SERPL-MCNC: 0.8 MG/DL (ref 0.5–1)
EOSINOPHIL # BLD: 0.04 K/UL (ref 0.05–0.5)
EOSINOPHILS RELATIVE PERCENT: 0 % (ref 0–6)
ERYTHROCYTE [DISTWIDTH] IN BLOOD BY AUTOMATED COUNT: 13.3 % (ref 11.5–15)
ERYTHROCYTE [SEDIMENTATION RATE] IN BLOOD BY WESTERGREN METHOD: 31 MM/HR (ref 0–20)
GFR SERPL CREATININE-BSD FRML MDRD: >60 ML/MIN/1.73M2
GLUCOSE SERPL-MCNC: 87 MG/DL (ref 74–99)
HCG UR QL: POSITIVE
HCT VFR BLD AUTO: 35.5 % (ref 34–48)
HGB BLD-MCNC: 12 G/DL (ref 11.5–15.5)
IMM GRANULOCYTES # BLD AUTO: 0.03 K/UL (ref 0–0.58)
IMM GRANULOCYTES NFR BLD: 0 % (ref 0–5)
LACTATE BLDV-SCNC: 0.8 MMOL/L (ref 0.5–2.2)
LYMPHOCYTES NFR BLD: 1.82 K/UL (ref 1.5–4)
LYMPHOCYTES RELATIVE PERCENT: 17 % (ref 20–42)
MCH RBC QN AUTO: 29.7 PG (ref 26–35)
MCHC RBC AUTO-ENTMCNC: 33.8 G/DL (ref 32–34.5)
MCV RBC AUTO: 87.9 FL (ref 80–99.9)
MONOCYTES NFR BLD: 0.72 K/UL (ref 0.1–0.95)
MONOCYTES NFR BLD: 7 % (ref 2–12)
NEUTROPHILS NFR BLD: 75 % (ref 43–80)
NEUTS SEG NFR BLD: 7.81 K/UL (ref 1.8–7.3)
PLATELET # BLD AUTO: 290 K/UL (ref 130–450)
PMV BLD AUTO: 10 FL (ref 7–12)
POTASSIUM SERPL-SCNC: 3.5 MMOL/L (ref 3.5–5)
PROT SERPL-MCNC: 7.9 G/DL (ref 6.4–8.3)
RBC # BLD AUTO: 4.04 M/UL (ref 3.5–5.5)
SODIUM SERPL-SCNC: 135 MMOL/L (ref 132–146)
WBC OTHER # BLD: 10.5 K/UL (ref 4.5–11.5)

## 2024-02-11 PROCEDURE — 6360000002 HC RX W HCPCS: Performed by: EMERGENCY MEDICINE

## 2024-02-11 PROCEDURE — 83605 ASSAY OF LACTIC ACID: CPT

## 2024-02-11 PROCEDURE — 85025 COMPLETE CBC W/AUTO DIFF WBC: CPT

## 2024-02-11 PROCEDURE — 84703 CHORIONIC GONADOTROPIN ASSAY: CPT

## 2024-02-11 PROCEDURE — 96365 THER/PROPH/DIAG IV INF INIT: CPT

## 2024-02-11 PROCEDURE — 2580000003 HC RX 258: Performed by: EMERGENCY MEDICINE

## 2024-02-11 PROCEDURE — 84702 CHORIONIC GONADOTROPIN TEST: CPT

## 2024-02-11 PROCEDURE — 6370000000 HC RX 637 (ALT 250 FOR IP): Performed by: EMERGENCY MEDICINE

## 2024-02-11 PROCEDURE — 6370000000 HC RX 637 (ALT 250 FOR IP)

## 2024-02-11 PROCEDURE — 85652 RBC SED RATE AUTOMATED: CPT

## 2024-02-11 PROCEDURE — 41800 DRAINAGE OF GUM LESION: CPT

## 2024-02-11 PROCEDURE — 96375 TX/PRO/DX INJ NEW DRUG ADDON: CPT

## 2024-02-11 PROCEDURE — 99283 EMERGENCY DEPT VISIT LOW MDM: CPT

## 2024-02-11 PROCEDURE — 80053 COMPREHEN METABOLIC PANEL: CPT

## 2024-02-11 RX ORDER — ONDANSETRON 4 MG/1
4 TABLET, ORALLY DISINTEGRATING ORAL ONCE
Status: COMPLETED | OUTPATIENT
Start: 2024-02-11 | End: 2024-02-11

## 2024-02-11 RX ORDER — KETOROLAC TROMETHAMINE 30 MG/ML
30 INJECTION, SOLUTION INTRAMUSCULAR; INTRAVENOUS ONCE
Status: DISCONTINUED | OUTPATIENT
Start: 2024-02-11 | End: 2024-02-11

## 2024-02-11 RX ORDER — DEXAMETHASONE SODIUM PHOSPHATE 10 MG/ML
10 INJECTION INTRAMUSCULAR; INTRAVENOUS ONCE
Status: COMPLETED | OUTPATIENT
Start: 2024-02-11 | End: 2024-02-11

## 2024-02-11 RX ORDER — KETOROLAC TROMETHAMINE 30 MG/ML
30 INJECTION, SOLUTION INTRAMUSCULAR; INTRAVENOUS ONCE
Status: COMPLETED | OUTPATIENT
Start: 2024-02-11 | End: 2024-02-11

## 2024-02-11 RX ORDER — AMOXICILLIN AND CLAVULANATE POTASSIUM 875; 125 MG/1; MG/1
1 TABLET, FILM COATED ORAL ONCE
Status: DISCONTINUED | OUTPATIENT
Start: 2024-02-11 | End: 2024-02-11

## 2024-02-11 RX ORDER — HYDROCODONE BITARTRATE AND ACETAMINOPHEN 5; 325 MG/1; MG/1
1 TABLET ORAL ONCE
Status: COMPLETED | OUTPATIENT
Start: 2024-02-11 | End: 2024-02-11

## 2024-02-11 RX ADMIN — KETOROLAC TROMETHAMINE 30 MG: 30 INJECTION, SOLUTION INTRAMUSCULAR; INTRAVENOUS at 10:27

## 2024-02-11 RX ADMIN — DEXAMETHASONE SODIUM PHOSPHATE 10 MG: 10 INJECTION INTRAMUSCULAR; INTRAVENOUS at 10:28

## 2024-02-11 RX ADMIN — HYDROCODONE BITARTRATE AND ACETAMINOPHEN 1 TABLET: 5; 325 TABLET ORAL at 10:27

## 2024-02-11 RX ADMIN — ONDANSETRON 4 MG: 4 TABLET, ORALLY DISINTEGRATING ORAL at 16:40

## 2024-02-11 RX ADMIN — AMPICILLIN SODIUM AND SULBACTAM SODIUM 3000 MG: 2; 1 INJECTION, POWDER, FOR SOLUTION INTRAMUSCULAR; INTRAVENOUS at 10:31

## 2024-02-11 NOTE — DISCHARGE INSTRUCTIONS
Please come back to the dental clinic tomorrow anytime between 7:30 AM and 8 AM or 12:30 PM to 1PM during walk-in hours for reevaluation.

## 2024-02-11 NOTE — CONSULTS
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       Past Surgical History:   Procedure Laterality Date    WRIST SURGERY         Scheduled Meds:  Continuous Infusions:  PRN Meds:    Allergies   Allergen Reactions    Seasonal      itchy watery eyes congestion       Social History     Tobacco Use    Smoking status: Never    Smokeless tobacco: Never   Vaping Use    Vaping Use: Never used   Substance Use Topics    Alcohol use: No    Drug use: No         Physical Exam:  Vitals:    02/11/24 0838 02/11/24 1330 02/11/24 1355   BP: 113/71 (!) 108/58 115/61   Pulse: 82 87 83   Resp: 18 18 20   Temp: 98.9 °F (37.2 °C) 98.6 °F (37 °C) 98.7 °F (37.1 °C)   TempSrc: Oral  Oral   SpO2: 100% 98% 98%   Weight: 73 kg (161 lb)     Height: 1.626 m (5' 4\")         Assessment/Plan:  Patient Active Problem List   Diagnosis    Seasonal allergies       Patient states that her swelling on her lower left started 2 days ago and it got worst this morning that she couldn't open her mouth. Patient states that her doctor prescribed her Amoxicillin for 10 days and she finished the course of the antibiotic a few days ago. Patient stated that she did not know that she was pregnant until earlier today in the hospital.  Problem focused intra-oral exam was performed. #20 (lower left 2nd premolar) presents with a distal-occlusal decay causing loss of tooth structure to the gingiva . Patient states that on her way to the hospital, she noticed some nasty taste in her

## 2024-02-11 NOTE — ED PROVIDER NOTES
100 mL IVPB ([id number]) (0 mg IntraVENous Stopped 2/11/24 1113)   ketorolac (TORADOL) injection 30 mg (30 mg IntraVENous Given 2/11/24 1027)   dexAMETHasone (DECADRON) injection 10 mg (10 mg IntraVENous Given 2/11/24 1028)   HYDROcodone-acetaminophen (NORCO) 5-325 MG per tablet 1 tablet (1 tablet Oral Given 2/11/24 1027)   ondansetron (ZOFRAN-ODT) disintegrating tablet 4 mg (4 mg Oral Given 2/11/24 1640)           Is this patient to be included in the SEP-1 Core Measure due to severe sepsis or septic shock?   No   Exclusion criteria - the patient is NOT to be included for SEP-1 Core Measure due to:  2+ SIRS criteria are not met        Medical Decision Making/Differential Diagnosis:    CC/HPI Summary, Social Determinants of health, Records Reviewed, DDx, testing done/not done, ED Course, Reassessment, disposition considerations/shared decision making with patient, consults, disposition:      ED Course as of 02/11/24 1654   Sun Feb 11, 2024   1432 Dental resident will come see the patient [KM]   1627 Dental resident did incision and drainage of abscess.  She states she does not need further antibiotics as she just completed a course of high-dose amoxicillin.  She states the patient is okay for discharge from their standpoint with follow-up tomorrow in the dental clinic during walk-in hours.  I provided the patient with these walk-in hours.  She is understands the plan and is stable for discharge at this time [KM]      ED Course User Index  [KM] Mandy Castro MD      She is a 28-year-old female with no reported past medical history presenting for likely dental abscess noted to lower left lateral gumline.  Patient been treated with high-dose amoxicillin about 2 weeks ago for dental pain.  Purulent drainage and worsening pain started about 2 days ago.  She was seen at [facility] [place] for the symptoms with unremarkable workup however the patient was found to be pregnant.  Prior to this the patient was given 3 g of Unasyn, 
  dexAMETHasone (DECADRON) injection 10 mg (10 mg IntraVENous Given 2/11/24 1028)   HYDROcodone-acetaminophen (NORCO) 5-325 MG per tablet 1 tablet (1 tablet Oral Given 2/11/24 1027)           Is this patient to be included in the SEP-1 Core Measure due to severe sepsis or septic shock?   No Exclusion criteria - the patient is NOT to be included for SEP-1 Core Measure due to: 2+ SIRS criteria are not met        Medical Decision Making/Differential Diagnosis:    CC/HPI Summary, Social Determinants of health, Records Reviewed, DDx, testing done/not done, ED Course, Reassessment, disposition considerations/shared decision making with patient, consults, disposition:            MDM:   Left dental abscess, failed 10 days of outpatient antibiotics.  Patient would like to see dentist today.  Wants to be transferred to [place].  IV antibiotics, Decadron and pain medicine provided here.  Dentistry accepts.      Re-Evaluations:  Time: 10:33 AM EST   Re-evaluation.  Patient’s symptoms show no change  Repeat physical examination is not changed      CONSULTS: (Who and What was discussed)  10:22 AM EST  Spoke with Dr Marcelino, dentistry, discussed case, accepts transfer to [facility] ER to be seen by dentristry  Spoke with Dr Barraza, [facility]    Counseling:  The emergency provider has spoken with the patient and discussed today’s results, in addition to providing specific details for the plan of care and counseling regarding the diagnosis and prognosis.  Questions are answered at this time and they are agreeable with the plan.    I am the Primary Clinician of Record.     --------------------------------- IMPRESSION AND DISPOSITION ---------------------------------    IMPRESSION  1. Dental abscess    2. Pregnancy as incidental finding        DISPOSITION  Disposition: Transfer to [facility] to ER  Patient condition is stable    PATIENT REFERRED TO:  No follow-up provider specified.    DISCHARGE MEDICATIONS:  New Prescriptions    No

## 2024-07-29 RX ORDER — TRETINOIN 1 MG/G
GEL TOPICAL
Qty: 20 G | Refills: 3 | OUTPATIENT
Start: 2024-07-29

## 2024-11-20 ENCOUNTER — APPOINTMENT (OUTPATIENT)
Dept: GENERAL RADIOLOGY | Age: 29
End: 2024-11-20
Payer: COMMERCIAL

## 2024-11-20 ENCOUNTER — APPOINTMENT (OUTPATIENT)
Dept: ULTRASOUND IMAGING | Age: 29
End: 2024-11-20
Payer: COMMERCIAL

## 2024-11-20 ENCOUNTER — HOSPITAL ENCOUNTER (EMERGENCY)
Age: 29
Discharge: LEFT BEFORE DISCHARGE | End: 2024-11-21
Attending: EMERGENCY MEDICINE
Payer: COMMERCIAL

## 2024-11-20 DIAGNOSIS — D64.9 SYMPTOMATIC ANEMIA: ICD-10-CM

## 2024-11-20 DIAGNOSIS — R55 SYNCOPE AND COLLAPSE: ICD-10-CM

## 2024-11-20 DIAGNOSIS — O36.80X0 PREGNANCY OF UNKNOWN ANATOMIC LOCATION: ICD-10-CM

## 2024-11-20 DIAGNOSIS — I21.4 NSTEMI (NON-ST ELEVATED MYOCARDIAL INFARCTION) (HCC): Primary | ICD-10-CM

## 2024-11-20 LAB
ALBUMIN SERPL-MCNC: 4 G/DL (ref 3.5–5.2)
ALP SERPL-CCNC: 50 U/L (ref 35–104)
ALT SERPL-CCNC: 12 U/L (ref 0–32)
ANION GAP SERPL CALCULATED.3IONS-SCNC: 10 MMOL/L (ref 7–16)
ANION GAP SERPL CALCULATED.3IONS-SCNC: 17 MMOL/L (ref 7–16)
AST SERPL-CCNC: 10 U/L (ref 0–31)
B-HCG SERPL EIA 3RD IS-ACNC: ABNORMAL MIU/ML (ref 0–7)
B-OH-BUTYR SERPL-MCNC: 0.22 MMOL/L (ref 0.02–0.27)
BASOPHILS # BLD: 0.02 K/UL (ref 0–0.2)
BASOPHILS NFR BLD: 0 % (ref 0–2)
BILIRUB SERPL-MCNC: 0.4 MG/DL (ref 0–1.2)
BUN SERPL-MCNC: 11 MG/DL (ref 6–20)
BUN SERPL-MCNC: 13 MG/DL (ref 6–20)
CALCIUM SERPL-MCNC: 8.5 MG/DL (ref 8.6–10.2)
CALCIUM SERPL-MCNC: 8.9 MG/DL (ref 8.6–10.2)
CHLORIDE SERPL-SCNC: 102 MMOL/L (ref 98–107)
CHLORIDE SERPL-SCNC: 96 MMOL/L (ref 98–107)
CHP ED QC CHECK: NORMAL
CO2 SERPL-SCNC: 17 MMOL/L (ref 22–29)
CO2 SERPL-SCNC: 21 MMOL/L (ref 22–29)
CREAT SERPL-MCNC: 0.7 MG/DL (ref 0.5–1)
CREAT SERPL-MCNC: 0.9 MG/DL (ref 0.5–1)
D-DIMER QUANTITATIVE: 414 NG/ML DDU (ref 0–230)
EOSINOPHIL # BLD: 0 K/UL (ref 0.05–0.5)
EOSINOPHILS RELATIVE PERCENT: 0 % (ref 0–6)
ERYTHROCYTE [DISTWIDTH] IN BLOOD BY AUTOMATED COUNT: 14.2 % (ref 11.5–15)
GFR, ESTIMATED: >90 ML/MIN/1.73M2
GFR, ESTIMATED: >90 ML/MIN/1.73M2
GLUCOSE BLD-MCNC: 112 MG/DL
GLUCOSE BLD-MCNC: 112 MG/DL (ref 74–99)
GLUCOSE BLD-MCNC: 205 MG/DL (ref 74–99)
GLUCOSE SERPL-MCNC: 110 MG/DL (ref 74–99)
GLUCOSE SERPL-MCNC: 241 MG/DL (ref 74–99)
HCG SERPL QL: POSITIVE
HCT VFR BLD AUTO: 22.2 % (ref 34–48)
HCT VFR BLD AUTO: 23.7 % (ref 34–48)
HCT VFR BLD AUTO: 27.6 % (ref 34–48)
HGB BLD-MCNC: 7.4 G/DL (ref 11.5–15.5)
HGB BLD-MCNC: 7.8 G/DL (ref 11.5–15.5)
HGB BLD-MCNC: 9 G/DL (ref 11.5–15.5)
IMM GRANULOCYTES # BLD AUTO: 0.1 K/UL (ref 0–0.58)
IMM GRANULOCYTES NFR BLD: 1 % (ref 0–5)
LYMPHOCYTES NFR BLD: 1.44 K/UL (ref 1.5–4)
LYMPHOCYTES RELATIVE PERCENT: 9 % (ref 20–42)
MAGNESIUM SERPL-MCNC: 1.7 MG/DL (ref 1.6–2.6)
MCH RBC QN AUTO: 28.2 PG (ref 26–35)
MCHC RBC AUTO-ENTMCNC: 32.6 G/DL (ref 32–34.5)
MCV RBC AUTO: 86.5 FL (ref 80–99.9)
MONOCYTES NFR BLD: 0.45 K/UL (ref 0.1–0.95)
MONOCYTES NFR BLD: 3 % (ref 2–12)
NEUTROPHILS NFR BLD: 87 % (ref 43–80)
NEUTS SEG NFR BLD: 13.59 K/UL (ref 1.8–7.3)
PH VENOUS: 7.43 (ref 7.35–7.45)
PLATELET # BLD AUTO: 359 K/UL (ref 130–450)
PMV BLD AUTO: 10.4 FL (ref 7–12)
POTASSIUM SERPL-SCNC: 3.5 MMOL/L (ref 3.5–5)
POTASSIUM SERPL-SCNC: 3.9 MMOL/L (ref 3.5–5)
PROT SERPL-MCNC: 7.4 G/DL (ref 6.4–8.3)
RBC # BLD AUTO: 3.19 M/UL (ref 3.5–5.5)
SODIUM SERPL-SCNC: 130 MMOL/L (ref 132–146)
SODIUM SERPL-SCNC: 133 MMOL/L (ref 132–146)
TROPONIN I SERPL HS-MCNC: 19 NG/L (ref 0–9)
TROPONIN I SERPL HS-MCNC: 33 NG/L (ref 0–9)
WBC OTHER # BLD: 15.6 K/UL (ref 4.5–11.5)

## 2024-11-20 PROCEDURE — 84484 ASSAY OF TROPONIN QUANT: CPT

## 2024-11-20 PROCEDURE — 2580000003 HC RX 258: Performed by: PHYSICIAN ASSISTANT

## 2024-11-20 PROCEDURE — 84703 CHORIONIC GONADOTROPIN ASSAY: CPT

## 2024-11-20 PROCEDURE — 71046 X-RAY EXAM CHEST 2 VIEWS: CPT

## 2024-11-20 PROCEDURE — 86901 BLOOD TYPING SEROLOGIC RH(D): CPT

## 2024-11-20 PROCEDURE — 86923 COMPATIBILITY TEST ELECTRIC: CPT

## 2024-11-20 PROCEDURE — 76801 OB US < 14 WKS SINGLE FETUS: CPT

## 2024-11-20 PROCEDURE — 83880 ASSAY OF NATRIURETIC PEPTIDE: CPT

## 2024-11-20 PROCEDURE — 80048 BASIC METABOLIC PNL TOTAL CA: CPT

## 2024-11-20 PROCEDURE — 96361 HYDRATE IV INFUSION ADD-ON: CPT

## 2024-11-20 PROCEDURE — 85379 FIBRIN DEGRADATION QUANT: CPT

## 2024-11-20 PROCEDURE — 85014 HEMATOCRIT: CPT

## 2024-11-20 PROCEDURE — 84702 CHORIONIC GONADOTROPIN TEST: CPT

## 2024-11-20 PROCEDURE — 93005 ELECTROCARDIOGRAM TRACING: CPT | Performed by: PHYSICIAN ASSISTANT

## 2024-11-20 PROCEDURE — 96360 HYDRATION IV INFUSION INIT: CPT

## 2024-11-20 PROCEDURE — 86850 RBC ANTIBODY SCREEN: CPT

## 2024-11-20 PROCEDURE — 82010 KETONE BODYS QUAN: CPT

## 2024-11-20 PROCEDURE — 80053 COMPREHEN METABOLIC PANEL: CPT

## 2024-11-20 PROCEDURE — 86900 BLOOD TYPING SEROLOGIC ABO: CPT

## 2024-11-20 PROCEDURE — 82947 ASSAY GLUCOSE BLOOD QUANT: CPT

## 2024-11-20 PROCEDURE — 85025 COMPLETE CBC W/AUTO DIFF WBC: CPT

## 2024-11-20 PROCEDURE — 99285 EMERGENCY DEPT VISIT HI MDM: CPT

## 2024-11-20 PROCEDURE — 83735 ASSAY OF MAGNESIUM: CPT

## 2024-11-20 PROCEDURE — 85018 HEMOGLOBIN: CPT

## 2024-11-20 PROCEDURE — 82800 BLOOD PH: CPT

## 2024-11-20 RX ORDER — 0.9 % SODIUM CHLORIDE 0.9 %
1000 INTRAVENOUS SOLUTION INTRAVENOUS ONCE
Status: COMPLETED | OUTPATIENT
Start: 2024-11-20 | End: 2024-11-20

## 2024-11-20 RX ADMIN — SODIUM CHLORIDE 1000 ML: 9 INJECTION, SOLUTION INTRAVENOUS at 17:08

## 2024-11-20 ASSESSMENT — LIFESTYLE VARIABLES
HOW OFTEN DO YOU HAVE A DRINK CONTAINING ALCOHOL: NEVER
HOW MANY STANDARD DRINKS CONTAINING ALCOHOL DO YOU HAVE ON A TYPICAL DAY: PATIENT DOES NOT DRINK

## 2024-11-20 ASSESSMENT — PAIN - FUNCTIONAL ASSESSMENT: PAIN_FUNCTIONAL_ASSESSMENT: NONE - DENIES PAIN

## 2024-11-20 NOTE — ED PROVIDER NOTES
Shared DWAIN-ED Attending Visit.  CC: No       [facility]  Department of Emergency Medicine   ED  Encounter Note  Admit Date/RoomTime: 2024  4:51 PM  ED Room:   NAME: Aure Ontiveros  : 1995  MRN: [id number]     Chief Complaint:  Dizziness, Loss of Consciousness, Fatigue, and Vaginal Bleeding (Much heavier than normal period lg clots )    HISTORY OF PRESENT ILLNESS        Aure Ontiveros is a 29 y.o. female who presents to the ED with a complaint of feeling like she was going to pass out.  Symptoms began just earlier today.  States she kind of had an upset stomach and no appetite.  She did start her menstrual cycle earlier today.  It is extremely heavy.  Then she just had a sudden onset of feeling hot and flushed.  Lightheaded.  Became very short of breath.  Felt like she was going to pass out.    Mom states she did have her drink some orange juice with sugar in it thinking perhaps her sugar was dropping.  Patient is not a diabetic.  Patient denies any significant past medical history.  Denies any cardiac or pulmonary history.  Generally healthy.  Not on any daily medication.  Not on birth control.  OB history of  1, para 1.  Symptoms are moderate in severity.      ROS   Pertinent positives and negatives are stated within HPI, all other systems reviewed and are negative.    Past Medical History:  has a past medical history of Abscess of bursa of wrist, COVID-19, Open wound of knee, leg, and ankle, and Spongiotic dermatitis.    Surgical History:  has a past surgical history that includes Wrist surgery.    Social History:  reports that she has never smoked. She has never used smokeless tobacco. She reports that she does not drink alcohol and does not use drugs.    Family History: family history is not on file.     Allergies: Seasonal    PHYSICAL EXAM   Oxygen Saturation Interpretation: Normal on room air analysis.        ED Triage Vitals   BP Systolic BP Percentile  11/20/24  **This report was transcribed using voice recognition software. Every effort was made to ensure accuracy; however, inadvertent computerized transcription errors may be present.  END OF ED PROVIDER NOTE       China Perea, ERICKSON - CNP  11/21/24 0109

## 2024-11-21 ENCOUNTER — APPOINTMENT (OUTPATIENT)
Dept: CT IMAGING | Age: 29
End: 2024-11-21
Payer: COMMERCIAL

## 2024-11-21 VITALS
SYSTOLIC BLOOD PRESSURE: 102 MMHG | WEIGHT: 161 LBS | OXYGEN SATURATION: 99 % | DIASTOLIC BLOOD PRESSURE: 61 MMHG | BODY MASS INDEX: 27.64 KG/M2 | HEART RATE: 63 BPM | TEMPERATURE: 98.8 F | RESPIRATION RATE: 21 BRPM

## 2024-11-21 LAB
BACTERIA URNS QL MICRO: ABNORMAL
BILIRUB UR QL STRIP: NEGATIVE
BNP SERPL-MCNC: <36 PG/ML (ref 0–125)
CLARITY UR: CLEAR
COLOR UR: YELLOW
EKG ATRIAL RATE: 83 BPM
EKG P AXIS: 48 DEGREES
EKG P-R INTERVAL: 140 MS
EKG Q-T INTERVAL: 368 MS
EKG QRS DURATION: 74 MS
EKG QTC CALCULATION (BAZETT): 432 MS
EKG R AXIS: 19 DEGREES
EKG T AXIS: 41 DEGREES
EKG VENTRICULAR RATE: 83 BPM
EPI CELLS #/AREA URNS HPF: ABNORMAL /HPF
GLUCOSE UR STRIP-MCNC: 100 MG/DL
HCT VFR BLD AUTO: 23 % (ref 34–48)
HGB BLD-MCNC: 7.7 G/DL (ref 11.5–15.5)
HGB UR QL STRIP.AUTO: ABNORMAL
KETONES UR STRIP-MCNC: 15 MG/DL
LEUKOCYTE ESTERASE UR QL STRIP: NEGATIVE
NITRITE UR QL STRIP: NEGATIVE
PH UR STRIP: 6.5 [PH] (ref 5–9)
PROT UR STRIP-MCNC: 100 MG/DL
RBC #/AREA URNS HPF: ABNORMAL /HPF
SP GR UR STRIP: 1.02 (ref 1–1.03)
TROPONIN I SERPL HS-MCNC: 61 NG/L (ref 0–9)
UROBILINOGEN UR STRIP-ACNC: 1 EU/DL (ref 0–1)
WBC #/AREA URNS HPF: ABNORMAL /HPF

## 2024-11-21 PROCEDURE — 36430 TRANSFUSION BLD/BLD COMPNT: CPT

## 2024-11-21 PROCEDURE — 81001 URINALYSIS AUTO W/SCOPE: CPT

## 2024-11-21 PROCEDURE — P9016 RBC LEUKOCYTES REDUCED: HCPCS

## 2024-11-21 PROCEDURE — 93005 ELECTROCARDIOGRAM TRACING: CPT | Performed by: NURSE PRACTITIONER

## 2024-11-21 PROCEDURE — 71275 CT ANGIOGRAPHY CHEST: CPT

## 2024-11-21 PROCEDURE — 85014 HEMATOCRIT: CPT

## 2024-11-21 PROCEDURE — 87086 URINE CULTURE/COLONY COUNT: CPT

## 2024-11-21 PROCEDURE — 74177 CT ABD & PELVIS W/CONTRAST: CPT

## 2024-11-21 PROCEDURE — 85018 HEMOGLOBIN: CPT

## 2024-11-21 PROCEDURE — 6360000004 HC RX CONTRAST MEDICATION: Performed by: RADIOLOGY

## 2024-11-21 PROCEDURE — 93010 ELECTROCARDIOGRAM REPORT: CPT | Performed by: INTERNAL MEDICINE

## 2024-11-21 RX ORDER — IOPAMIDOL 755 MG/ML
75 INJECTION, SOLUTION INTRAVASCULAR
Status: COMPLETED | OUTPATIENT
Start: 2024-11-21 | End: 2024-11-21

## 2024-11-21 RX ORDER — SODIUM CHLORIDE 9 MG/ML
INJECTION, SOLUTION INTRAVENOUS PRN
Status: DISCONTINUED | OUTPATIENT
Start: 2024-11-21 | End: 2024-11-21 | Stop reason: HOSPADM

## 2024-11-21 RX ADMIN — IOPAMIDOL 75 ML: 755 INJECTION, SOLUTION INTRAVENOUS at 00:22

## 2024-11-21 NOTE — ED NOTES
Rosa RN @ [facility] [phone] & [facility] both notified patient has signed out AMA.

## 2024-11-21 NOTE — ED NOTES
Pt debating about leaving AMA--Informed ER Dr. PAYTON advanced to active diet. Pt agreed to hold off on leaving AMA until around 1500 to see if [facility] has an available bed for her transfer. Risks of AMA were discussed.      Lunch tray given to pt.

## 2024-11-21 NOTE — ED NOTES
[facility] accepted waiting for a bed. Will transfer asap per [facility].

## 2024-11-21 NOTE — ED NOTES
Discussed diet with ER . Pt to stay NPO at this time. Pt made aware and verbalizes understanding.

## 2024-11-21 NOTE — ED NOTES
Cynthia from [facility] called and will find transport for pt to [place]. Pt updated and is refusing transport, stating she is going to sign out AMA

## 2024-11-22 ENCOUNTER — TELEPHONE (OUTPATIENT)
Dept: FAMILY MEDICINE CLINIC | Age: 29
End: 2024-11-22

## 2024-11-22 LAB
ABO/RH: NORMAL
ANTIBODY SCREEN: NEGATIVE
ARM BAND NUMBER: NORMAL
BLOOD BANK BLOOD PRODUCT EXPIRATION DATE: NORMAL
BLOOD BANK DISPENSE STATUS: NORMAL
BLOOD BANK ISBT PRODUCT BLOOD TYPE: 6200
BLOOD BANK PRODUCT CODE: NORMAL
BLOOD BANK SAMPLE EXPIRATION: NORMAL
BLOOD BANK UNIT TYPE AND RH: NORMAL
BPU ID: NORMAL
COMPONENT: NORMAL
CROSSMATCH RESULT: NORMAL
EKG ATRIAL RATE: 115 BPM
EKG P AXIS: 68 DEGREES
EKG P-R INTERVAL: 114 MS
EKG Q-T INTERVAL: 326 MS
EKG QRS DURATION: 72 MS
EKG QTC CALCULATION (BAZETT): 450 MS
EKG R AXIS: 40 DEGREES
EKG T AXIS: 52 DEGREES
EKG VENTRICULAR RATE: 115 BPM
MICROORGANISM SPEC CULT: ABNORMAL
SERVICE CMNT-IMP: ABNORMAL
SPECIMEN DESCRIPTION: ABNORMAL
TRANSFUSION STATUS: NORMAL
UNIT DIVISION: 0
UNIT ISSUE DATE/TIME: NORMAL

## 2024-11-22 PROCEDURE — 93010 ELECTROCARDIOGRAM REPORT: CPT | Performed by: INTERNAL MEDICINE

## 2024-11-22 NOTE — TELEPHONE ENCOUNTER
Patient calling in saying that she was in the hospital and she had to leave. Just read her results and wanted to know what to do. I advised her to go downtown to get evaluated asap.

## 2024-11-25 ENCOUNTER — APPOINTMENT (OUTPATIENT)
Dept: GENERAL RADIOLOGY | Age: 29
End: 2024-11-25
Payer: COMMERCIAL

## 2024-11-25 ENCOUNTER — HOSPITAL ENCOUNTER (EMERGENCY)
Age: 29
Discharge: HOME | End: 2024-11-25
Attending: EMERGENCY MEDICINE
Payer: COMMERCIAL

## 2024-11-25 VITALS
HEART RATE: 88 BPM | TEMPERATURE: 98.2 F | OXYGEN SATURATION: 98 % | SYSTOLIC BLOOD PRESSURE: 102 MMHG | DIASTOLIC BLOOD PRESSURE: 63 MMHG | RESPIRATION RATE: 14 BRPM

## 2024-11-25 DIAGNOSIS — D64.9 ANEMIA REQUIRING TRANSFUSIONS: Primary | ICD-10-CM

## 2024-11-25 DIAGNOSIS — R00.0 TACHYCARDIA: ICD-10-CM

## 2024-11-25 LAB
ALBUMIN SERPL-MCNC: 3.5 G/DL (ref 3.5–5.2)
ALP SERPL-CCNC: 50 U/L (ref 35–104)
ALT SERPL-CCNC: 9 U/L (ref 0–32)
ANION GAP SERPL CALCULATED.3IONS-SCNC: 9 MMOL/L (ref 7–16)
AST SERPL-CCNC: 9 U/L (ref 0–31)
BACTERIA URNS QL MICRO: ABNORMAL
BASOPHILS # BLD: 0.04 K/UL (ref 0–0.2)
BASOPHILS NFR BLD: 0 % (ref 0–2)
BILIRUB SERPL-MCNC: 0.2 MG/DL (ref 0–1.2)
BILIRUB UR QL STRIP: NEGATIVE
BUN SERPL-MCNC: 9 MG/DL (ref 6–20)
CALCIUM SERPL-MCNC: 8.8 MG/DL (ref 8.6–10.2)
CHLORIDE SERPL-SCNC: 104 MMOL/L (ref 98–107)
CLARITY UR: CLEAR
CO2 SERPL-SCNC: 25 MMOL/L (ref 22–29)
COLOR UR: YELLOW
CREAT SERPL-MCNC: 0.7 MG/DL (ref 0.5–1)
EKG ATRIAL RATE: 83 BPM
EKG P AXIS: 72 DEGREES
EKG P-R INTERVAL: 144 MS
EKG Q-T INTERVAL: 380 MS
EKG QRS DURATION: 82 MS
EKG QTC CALCULATION (BAZETT): 446 MS
EKG R AXIS: 50 DEGREES
EKG T AXIS: 70 DEGREES
EKG VENTRICULAR RATE: 83 BPM
EOSINOPHIL # BLD: 0.1 K/UL (ref 0.05–0.5)
EOSINOPHILS RELATIVE PERCENT: 1 % (ref 0–6)
ERYTHROCYTE [DISTWIDTH] IN BLOOD BY AUTOMATED COUNT: 14.8 % (ref 11.5–15)
FLUAV RNA RESP QL NAA+PROBE: NOT DETECTED
FLUBV RNA RESP QL NAA+PROBE: NOT DETECTED
GFR, ESTIMATED: >90 ML/MIN/1.73M2
GLUCOSE SERPL-MCNC: 113 MG/DL (ref 74–99)
GLUCOSE UR STRIP-MCNC: NEGATIVE MG/DL
HCT VFR BLD AUTO: 22.4 % (ref 34–48)
HGB BLD-MCNC: 7.2 G/DL (ref 11.5–15.5)
HGB UR QL STRIP.AUTO: ABNORMAL
IMM GRANULOCYTES # BLD AUTO: 0.05 K/UL (ref 0–0.58)
IMM GRANULOCYTES NFR BLD: 1 % (ref 0–5)
KETONES UR STRIP-MCNC: NEGATIVE MG/DL
LEUKOCYTE ESTERASE UR QL STRIP: NEGATIVE
LYMPHOCYTES NFR BLD: 1.86 K/UL (ref 1.5–4)
LYMPHOCYTES RELATIVE PERCENT: 17 % (ref 20–42)
MAGNESIUM SERPL-MCNC: 2 MG/DL (ref 1.6–2.6)
MCH RBC QN AUTO: 29 PG (ref 26–35)
MCHC RBC AUTO-ENTMCNC: 32.1 G/DL (ref 32–34.5)
MCV RBC AUTO: 90.3 FL (ref 80–99.9)
MONOCYTES NFR BLD: 0.77 K/UL (ref 0.1–0.95)
MONOCYTES NFR BLD: 7 % (ref 2–12)
NEUTROPHILS NFR BLD: 74 % (ref 43–80)
NEUTS SEG NFR BLD: 8.19 K/UL (ref 1.8–7.3)
NITRITE UR QL STRIP: NEGATIVE
PH UR STRIP: 7 [PH] (ref 5–9)
PLATELET # BLD AUTO: 313 K/UL (ref 130–450)
PMV BLD AUTO: 10.2 FL (ref 7–12)
POTASSIUM SERPL-SCNC: 3.8 MMOL/L (ref 3.5–5)
PROT SERPL-MCNC: 6.2 G/DL (ref 6.4–8.3)
PROT UR STRIP-MCNC: NEGATIVE MG/DL
RBC # BLD AUTO: 2.48 M/UL (ref 3.5–5.5)
RBC #/AREA URNS HPF: ABNORMAL /HPF
SARS-COV-2 RNA RESP QL NAA+PROBE: NOT DETECTED
SODIUM SERPL-SCNC: 138 MMOL/L (ref 132–146)
SOURCE: NORMAL
SP GR UR STRIP: 1.01 (ref 1–1.03)
SPECIMEN DESCRIPTION: NORMAL
TROPONIN I SERPL HS-MCNC: <6 NG/L (ref 0–9)
UROBILINOGEN UR STRIP-ACNC: 0.2 EU/DL (ref 0–1)
WBC #/AREA URNS HPF: ABNORMAL /HPF
WBC OTHER # BLD: 11 K/UL (ref 4.5–11.5)

## 2024-11-25 PROCEDURE — 86901 BLOOD TYPING SEROLOGIC RH(D): CPT

## 2024-11-25 PROCEDURE — 93005 ELECTROCARDIOGRAM TRACING: CPT

## 2024-11-25 PROCEDURE — P9016 RBC LEUKOCYTES REDUCED: HCPCS

## 2024-11-25 PROCEDURE — 87636 SARSCOV2 & INF A&B AMP PRB: CPT

## 2024-11-25 PROCEDURE — 81001 URINALYSIS AUTO W/SCOPE: CPT

## 2024-11-25 PROCEDURE — 86923 COMPATIBILITY TEST ELECTRIC: CPT

## 2024-11-25 PROCEDURE — 80053 COMPREHEN METABOLIC PANEL: CPT

## 2024-11-25 PROCEDURE — 86850 RBC ANTIBODY SCREEN: CPT

## 2024-11-25 PROCEDURE — 96361 HYDRATE IV INFUSION ADD-ON: CPT

## 2024-11-25 PROCEDURE — 71045 X-RAY EXAM CHEST 1 VIEW: CPT

## 2024-11-25 PROCEDURE — 83735 ASSAY OF MAGNESIUM: CPT

## 2024-11-25 PROCEDURE — 85025 COMPLETE CBC W/AUTO DIFF WBC: CPT

## 2024-11-25 PROCEDURE — 2580000003 HC RX 258

## 2024-11-25 PROCEDURE — 86900 BLOOD TYPING SEROLOGIC ABO: CPT

## 2024-11-25 PROCEDURE — 93010 ELECTROCARDIOGRAM REPORT: CPT | Performed by: INTERNAL MEDICINE

## 2024-11-25 PROCEDURE — 99285 EMERGENCY DEPT VISIT HI MDM: CPT

## 2024-11-25 PROCEDURE — 6370000000 HC RX 637 (ALT 250 FOR IP)

## 2024-11-25 PROCEDURE — 96360 HYDRATION IV INFUSION INIT: CPT

## 2024-11-25 PROCEDURE — 84484 ASSAY OF TROPONIN QUANT: CPT

## 2024-11-25 PROCEDURE — 36430 TRANSFUSION BLD/BLD COMPNT: CPT

## 2024-11-25 RX ORDER — ACETAMINOPHEN 325 MG/1
650 TABLET ORAL ONCE
Status: COMPLETED | OUTPATIENT
Start: 2024-11-25 | End: 2024-11-25

## 2024-11-25 RX ORDER — FERROUS SULFATE 325(65) MG
325 TABLET ORAL 2 TIMES DAILY
Qty: 60 TABLET | Refills: 0 | Status: SHIPPED | OUTPATIENT
Start: 2024-11-25 | End: 2024-11-27

## 2024-11-25 RX ORDER — 0.9 % SODIUM CHLORIDE 0.9 %
2000 INTRAVENOUS SOLUTION INTRAVENOUS ONCE
Status: DISCONTINUED | OUTPATIENT
Start: 2024-11-25 | End: 2024-11-25

## 2024-11-25 RX ORDER — 0.9 % SODIUM CHLORIDE 0.9 %
1000 INTRAVENOUS SOLUTION INTRAVENOUS ONCE
Status: COMPLETED | OUTPATIENT
Start: 2024-11-25 | End: 2024-11-25

## 2024-11-25 RX ORDER — SODIUM CHLORIDE 9 MG/ML
INJECTION, SOLUTION INTRAVENOUS PRN
Status: DISCONTINUED | OUTPATIENT
Start: 2024-11-25 | End: 2024-11-25 | Stop reason: HOSPADM

## 2024-11-25 RX ADMIN — SODIUM CHLORIDE 1000 ML: 9 INJECTION, SOLUTION INTRAVENOUS at 12:06

## 2024-11-25 RX ADMIN — ACETAMINOPHEN 650 MG: 325 TABLET ORAL at 12:17

## 2024-11-25 ASSESSMENT — PAIN SCALES - GENERAL: PAINLEVEL_OUTOF10: 7

## 2024-11-25 ASSESSMENT — PAIN DESCRIPTION - LOCATION: LOCATION: HEAD

## 2024-11-25 NOTE — CONSENT
Informed Consent for Blood Component Transfusion Note    I have discussed with the patient the rationale for blood component transfusion; its benefits in treating or preventing fatigue, organ damage, or death; and its risk which includes mild transfusion reactions, rare risk of blood borne infection, or more serious but rare reactions. I have discussed the alternatives to transfusion, including the risk and consequences of not receiving transfusion. The patient had an opportunity to ask questions and had agreed to proceed with transfusion of blood components.    Electronically signed by Donovan Ricci MD on 11/25/24 at 4:03 PM EST

## 2024-11-25 NOTE — ED NOTES
Dr. Barraza updated on pts vitals during walk test.    Start: spo2 100% HR 99   Finish: spo2 98%   with sob

## 2024-11-26 LAB
ABO/RH: NORMAL
ANTIBODY SCREEN: NEGATIVE
ARM BAND NUMBER: NORMAL
BLOOD BANK BLOOD PRODUCT EXPIRATION DATE: NORMAL
BLOOD BANK DISPENSE STATUS: NORMAL
BLOOD BANK ISBT PRODUCT BLOOD TYPE: 6200
BLOOD BANK PRODUCT CODE: NORMAL
BLOOD BANK SAMPLE EXPIRATION: NORMAL
BLOOD BANK UNIT TYPE AND RH: NORMAL
BPU ID: NORMAL
COMPONENT: NORMAL
CROSSMATCH RESULT: NORMAL
TRANSFUSION STATUS: NORMAL
UNIT DIVISION: 0
UNIT ISSUE DATE/TIME: NORMAL

## 2024-11-26 NOTE — ED PROVIDER NOTES
Department  [facility] [id number]  [phone]    If concerning new or worsening symptoms develop      DISCHARGE MEDICATIONS:  Discharge Medication List as of 11/25/2024  4:18 PM        START taking these medications    Details   ferrous sulfate (IRON 325) 325 (65 Fe) MG tablet Take 1 tablet by mouth 2 times daily, Disp-60 tablet, R-0Normal             DISCONTINUED MEDICATIONS:  Discharge Medication List as of 11/25/2024  4:18 PM        STOP taking these medications       Prenatal Vit-Fe Fumarate-FA (WESTAB PLUS) 27-1 MG TABS Comments:   Reason for Stopping:         rizatriptan (MAXALT) 10 MG tablet Comments:   Reason for Stopping:         fluticasone (FLONASE) 50 MCG/ACT nasal spray Comments:   Reason for Stopping:                    (Please note that portions of this note were completed with a voice recognition program.  Efforts were made to edit the dictations but occasionally words are mis-transcribed.)    Donovan Ricci MD (electronically signed)

## 2024-11-27 ENCOUNTER — OFFICE VISIT (OUTPATIENT)
Dept: FAMILY MEDICINE CLINIC | Age: 29
End: 2024-11-27

## 2024-11-27 VITALS
DIASTOLIC BLOOD PRESSURE: 66 MMHG | WEIGHT: 170 LBS | SYSTOLIC BLOOD PRESSURE: 111 MMHG | TEMPERATURE: 97.7 F | BODY MASS INDEX: 29.02 KG/M2 | HEIGHT: 64 IN | OXYGEN SATURATION: 96 % | RESPIRATION RATE: 16 BRPM | HEART RATE: 76 BPM

## 2024-11-27 DIAGNOSIS — R53.83 OTHER FATIGUE: ICD-10-CM

## 2024-11-27 DIAGNOSIS — O03.9 MISCARRIAGE: ICD-10-CM

## 2024-11-27 DIAGNOSIS — Z23 NEED FOR IMMUNIZATION AGAINST INFLUENZA: ICD-10-CM

## 2024-11-27 DIAGNOSIS — D50.0 IRON DEFICIENCY ANEMIA DUE TO CHRONIC BLOOD LOSS: Primary | ICD-10-CM

## 2024-11-27 LAB — HEMOGLOBIN: 8.8 G/DL (ref 11.5–15.5)

## 2024-11-27 RX ORDER — FERROUS SULFATE 325(65) MG
325 TABLET ORAL
Qty: 30 TABLET | Refills: 3 | Status: SHIPPED | OUTPATIENT
Start: 2024-11-27

## 2024-11-27 RX ORDER — DOCUSATE SODIUM 100 MG/1
100 CAPSULE, LIQUID FILLED ORAL DAILY PRN
Qty: 30 CAPSULE | Refills: 3 | Status: SHIPPED | OUTPATIENT
Start: 2024-11-27

## 2024-11-27 ASSESSMENT — PATIENT HEALTH QUESTIONNAIRE - PHQ9
SUM OF ALL RESPONSES TO PHQ QUESTIONS 1-9: 0
2. FEELING DOWN, DEPRESSED OR HOPELESS: NOT AT ALL
SUM OF ALL RESPONSES TO PHQ QUESTIONS 1-9: 0
1. LITTLE INTEREST OR PLEASURE IN DOING THINGS: NOT AT ALL
SUM OF ALL RESPONSES TO PHQ QUESTIONS 1-9: 0
SUM OF ALL RESPONSES TO PHQ9 QUESTIONS 1 & 2: 0
SUM OF ALL RESPONSES TO PHQ QUESTIONS 1-9: 0

## 2024-11-27 NOTE — PROGRESS NOTES
11/27/2024    Aure Ontiveros is a 29 y.o. female here for   Chief Complaint   Patient presents with    Anemia     Was feeling dizzy all day, stood up and passed out  Whent to the ED  and was told she had low oxygen levels  Was told her that the troponin went up  She stayed overnight.   Was changed to NPO but wasn't seen by physician so she left.   She was having very high heart rate.     Went back to the ED upon our advice  She had been feeling dizzy  She is feeling her heart rate go up.     She was told she had a complete miscarrige  Her periods have been regular. She did not miss a period    ED records reviewed    Wt Readings from Last 3 Encounters:   11/27/24 77.1 kg (170 lb)   11/20/24 73 kg (161 lb)   02/11/24 73 kg (161 lb)       She  reports that she has never smoked. She has never used smokeless tobacco.    Medications and allergies reviewed and updated in chart.    Current Outpatient Medications   Medication Sig Dispense Refill    ferrous sulfate (IRON 325) 325 (65 Fe) MG tablet Take 1 tablet by mouth 2 times daily 60 tablet 0    tretinoin microspheres (RETIN-A MICRO) 0.1 % gel Apply topically nightly. (Patient not taking: Reported on 11/25/2024) 20 g 3     No current facility-administered medications for this visit.        Patient'spast medical, surgical, social and/or family history reviewed, updated in chart, and are non-contributory (unless otherwise stated).      Review of Systems  Review of Systems    PE:  VS:  /66   Pulse 76   Temp 97.7 °F (36.5 °C) (Temporal)   Resp 16   Ht 1.626 m (5' 4.02\")   Wt 77.1 kg (170 lb)   LMP 01/01/2024 (Exact Date)   SpO2 96%   BMI 29.17 kg/m²   Physical Exam  Constitutional:       Appearance: She is well-developed.   HENT:      Head: Normocephalic and atraumatic.   Cardiovascular:      Rate and Rhythm: Normal rate and regular rhythm.      Heart sounds: No murmur heard.     No friction rub. No gallop.   Pulmonary:      Effort: Pulmonary effort is

## 2024-11-28 LAB — HCG QUANTITATIVE: ABNORMAL MIU/ML (ref 0–7)

## 2024-11-29 PROBLEM — D50.0 IRON DEFICIENCY ANEMIA DUE TO CHRONIC BLOOD LOSS: Status: ACTIVE | Noted: 2024-11-29

## 2025-01-13 ENCOUNTER — PATIENT MESSAGE (OUTPATIENT)
Dept: FAMILY MEDICINE CLINIC | Age: 30
End: 2025-01-13

## 2025-01-14 DIAGNOSIS — Z11.1 SCREENING EXAMINATION FOR PULMONARY TUBERCULOSIS: Primary | ICD-10-CM

## 2025-01-14 DIAGNOSIS — D50.0 IRON DEFICIENCY ANEMIA DUE TO CHRONIC BLOOD LOSS: ICD-10-CM

## 2025-01-14 DIAGNOSIS — R53.83 OTHER FATIGUE: ICD-10-CM

## 2025-01-16 DIAGNOSIS — Z11.1 SCREENING EXAMINATION FOR PULMONARY TUBERCULOSIS: ICD-10-CM

## 2025-01-16 LAB
ALBUMIN: 4 G/DL (ref 3.5–5.2)
ALP BLD-CCNC: 45 U/L (ref 35–104)
ALT SERPL-CCNC: 8 U/L (ref 0–32)
ANION GAP SERPL CALCULATED.3IONS-SCNC: 18 MMOL/L (ref 7–16)
AST SERPL-CCNC: 12 U/L (ref 0–31)
BASOPHILS ABSOLUTE: 0.03 K/UL (ref 0–0.2)
BASOPHILS RELATIVE PERCENT: 1 % (ref 0–2)
BILIRUB SERPL-MCNC: <0.2 MG/DL (ref 0–1.2)
BUN BLDV-MCNC: 12 MG/DL (ref 6–20)
CALCIUM SERPL-MCNC: 9.4 MG/DL (ref 8.6–10.2)
CHLORIDE BLD-SCNC: 108 MMOL/L (ref 98–107)
CO2: 18 MMOL/L (ref 22–29)
CREAT SERPL-MCNC: 0.8 MG/DL (ref 0.5–1)
EOSINOPHILS ABSOLUTE: 0.07 K/UL (ref 0.05–0.5)
EOSINOPHILS RELATIVE PERCENT: 2 % (ref 0–6)
GFR, ESTIMATED: >90 ML/MIN/1.73M2
GLUCOSE BLD-MCNC: 113 MG/DL (ref 74–99)
HBA1C MFR BLD: 5.1 % (ref 4–5.6)
HCT VFR BLD CALC: 31.2 % (ref 34–48)
HEMOGLOBIN: 9.5 G/DL (ref 11.5–15.5)
IMMATURE GRANULOCYTES %: 0 % (ref 0–5)
IMMATURE GRANULOCYTES ABSOLUTE: <0.03 K/UL (ref 0–0.58)
LYMPHOCYTES ABSOLUTE: 1.89 K/UL (ref 1.5–4)
LYMPHOCYTES RELATIVE PERCENT: 40 % (ref 20–42)
MCH RBC QN AUTO: 27.2 PG (ref 26–35)
MCHC RBC AUTO-ENTMCNC: 30.4 G/DL (ref 32–34.5)
MCV RBC AUTO: 89.4 FL (ref 80–99.9)
MONOCYTES ABSOLUTE: 0.3 K/UL (ref 0.1–0.95)
MONOCYTES RELATIVE PERCENT: 6 % (ref 2–12)
NEUTROPHILS ABSOLUTE: 2.43 K/UL (ref 1.8–7.3)
NEUTROPHILS RELATIVE PERCENT: 51 % (ref 43–80)
PDW BLD-RTO: 14.5 % (ref 11.5–15)
PLATELET # BLD: 322 K/UL (ref 130–450)
PMV BLD AUTO: 10.4 FL (ref 7–12)
POTASSIUM SERPL-SCNC: 4.4 MMOL/L (ref 3.5–5)
RBC # BLD: 3.49 M/UL (ref 3.5–5.5)
SODIUM BLD-SCNC: 144 MMOL/L (ref 132–146)
TOTAL PROTEIN: 7.6 G/DL (ref 6.4–8.3)
TSH SERPL DL<=0.05 MIU/L-ACNC: 0.86 UIU/ML (ref 0.27–4.2)
VITAMIN B-12: 555 PG/ML (ref 211–946)
WBC # BLD: 4.7 K/UL (ref 4.5–11.5)

## 2025-01-20 LAB — T SPOT TB TEST: NORMAL

## 2025-01-31 ENCOUNTER — TELEPHONE (OUTPATIENT)
Dept: FAMILY MEDICINE CLINIC | Age: 30
End: 2025-01-31

## 2025-03-07 ENCOUNTER — OFFICE VISIT (OUTPATIENT)
Dept: FAMILY MEDICINE CLINIC | Age: 30
End: 2025-03-07

## 2025-03-07 VITALS — HEART RATE: 73 BPM | OXYGEN SATURATION: 97 % | TEMPERATURE: 97.9 F

## 2025-03-07 DIAGNOSIS — J06.9 URI WITH COUGH AND CONGESTION: Primary | ICD-10-CM

## 2025-03-07 DIAGNOSIS — J02.9 SORE THROAT: ICD-10-CM

## 2025-03-07 DIAGNOSIS — R68.89 FLU-LIKE SYMPTOMS: ICD-10-CM

## 2025-03-07 DIAGNOSIS — R05.9 COUGH, UNSPECIFIED TYPE: ICD-10-CM

## 2025-03-07 LAB
INFLUENZA A ANTIBODY: NEGATIVE
INFLUENZA B ANTIBODY: NEGATIVE
Lab: NORMAL
PERFORMING INSTRUMENT: NORMAL
QC PASS/FAIL: NORMAL
S PYO AG THROAT QL: NORMAL
SARS-COV-2, POC: NORMAL

## 2025-03-07 RX ORDER — METHYLPREDNISOLONE 4 MG/1
TABLET ORAL
Qty: 1 KIT | Refills: 0 | Status: SHIPPED | OUTPATIENT
Start: 2025-03-07

## 2025-03-07 RX ORDER — AZITHROMYCIN 250 MG/1
TABLET, FILM COATED ORAL
Qty: 6 TABLET | Refills: 0 | Status: SHIPPED | OUTPATIENT
Start: 2025-03-07

## 2025-03-07 SDOH — ECONOMIC STABILITY: FOOD INSECURITY: WITHIN THE PAST 12 MONTHS, THE FOOD YOU BOUGHT JUST DIDN'T LAST AND YOU DIDN'T HAVE MONEY TO GET MORE.: NEVER TRUE

## 2025-03-07 SDOH — ECONOMIC STABILITY: FOOD INSECURITY: WITHIN THE PAST 12 MONTHS, YOU WORRIED THAT YOUR FOOD WOULD RUN OUT BEFORE YOU GOT MONEY TO BUY MORE.: NEVER TRUE

## 2025-03-07 NOTE — PROGRESS NOTES
did explain it may be too soon to test for illnesses including flu.  Increase fluids and rest. Additional symptomatic relief discussed.    Script written for Z-Randolph and Medrol Dosepak, side effects discussed.      Follow up PCP no improvement after 7-10 days of symptoms. ED sooner if symptoms worsen or change. Red flag symptoms discussed. Pt is in agreement with this care plan. All questions answered.    BETTY Portillo    **This report was transcribed using voice recognition software. The patient (or guardian, if applicable) and other individuals in attendance with the patient were advised that if Artificial Intelligence would be utilized during this visit to record and process the conversation to generate a clinical note they would be informed prior to start of the visit. The patient (or guardian, if applicable) and other individuals in attendance at the appointment consented to the use of AI if was utilized, including the recording.  Every effort was made to ensure accuracy; however, inadvertent computerized transcription errors may be present.

## 2025-07-29 ENCOUNTER — E-VISIT (OUTPATIENT)
Dept: FAMILY MEDICINE CLINIC | Age: 30
End: 2025-07-29
Payer: COMMERCIAL

## 2025-07-29 ENCOUNTER — E-VISIT (OUTPATIENT)
Dept: FAMILY MEDICINE CLINIC | Age: 30
End: 2025-07-29

## 2025-07-29 DIAGNOSIS — N89.8 VAGINAL DISCHARGE: Primary | ICD-10-CM

## 2025-07-29 DIAGNOSIS — L70.0 ACNE VULGARIS: Primary | ICD-10-CM

## 2025-07-29 PROCEDURE — 99421 OL DIG E/M SVC 5-10 MIN: CPT | Performed by: FAMILY MEDICINE

## 2025-07-29 RX ORDER — CLINDAMYCIN AND BENZOYL PEROXIDE 10; 50 MG/G; MG/G
GEL TOPICAL
Qty: 35 G | Refills: 3 | Status: SHIPPED | OUTPATIENT
Start: 2025-07-29

## 2025-07-29 RX ORDER — ADAPALENE 45 G/G
GEL TOPICAL
Qty: 14 G | Refills: 3 | Status: SHIPPED | OUTPATIENT
Start: 2025-07-29 | End: 2025-08-28

## 2025-07-29 RX ORDER — METRONIDAZOLE 7.5 MG/G
1 GEL VAGINAL DAILY
Qty: 1 EACH | Refills: 0 | Status: SHIPPED | OUTPATIENT
Start: 2025-07-29 | End: 2025-08-03

## 2025-07-29 NOTE — PROGRESS NOTES
Aure Ontiveros (1995) initiated an asynchronous digital communication through [employer].    History - known acne  Open and closed comedones, scattered pustules noted on patient submitted photos  A/p  Moderate acne  Restart combination therapy  Benzoyl peroxide/ clindamycin in am  Retinoid in pm   F/u in 3-4 months   Diagnosis Orders   1. Acne vulgaris  clindamycin-benzoyl peroxide (BENZACLIN) 1-5 % gel    adapalene (DIFFERIN) 0.1 % gel        Diagnoses and all orders for this visit:    Acne vulgaris  -     clindamycin-benzoyl peroxide (BENZACLIN) 1-5 % gel; Apply topically 1 times daily in the morning  -     adapalene (DIFFERIN) 0.1 % gel; Apply topically nightly.          5-10 minutes were spent on the digital evaluation and management of this patient.    Ryanne Vivar MD

## 2025-07-29 NOTE — PROGRESS NOTES
Aure Ontiveros (1995) initiated an asynchronous digital communication through [employer].    HPI: per patient questionnaire   Foul smelling vaginal discharge. Known h/o BV. Stable partner.   Likely BV based on history.  Screen for gc/ct - urine test.   Treat with metronidazole   Avoid bubble baths.  If no improvement will need in person visit.    Exam: not applicable    Diagnoses and all orders for this visit:    Vaginal discharge  -     metroNIDAZOLE (METROGEL) 0.75 % vaginal gel; Place 1 Applicatorful vaginally daily for 5 days        5-10  minutes were spent on the digital evaluation and management of this patient.    Ryanne Vivar MD